# Patient Record
Sex: FEMALE | Race: WHITE | Employment: FULL TIME | URBAN - METROPOLITAN AREA
[De-identification: names, ages, dates, MRNs, and addresses within clinical notes are randomized per-mention and may not be internally consistent; named-entity substitution may affect disease eponyms.]

---

## 2017-07-24 ENCOUNTER — ALLSCRIPTS OFFICE VISIT (OUTPATIENT)
Dept: OTHER | Facility: OTHER | Age: 42
End: 2017-07-24

## 2017-07-24 DIAGNOSIS — Z00.00 ENCOUNTER FOR GENERAL ADULT MEDICAL EXAMINATION WITHOUT ABNORMAL FINDINGS: ICD-10-CM

## 2017-07-24 DIAGNOSIS — F41.8 OTHER SPECIFIED ANXIETY DISORDERS: ICD-10-CM

## 2017-07-24 DIAGNOSIS — E53.8 DEFICIENCY OF OTHER SPECIFIED B GROUP VITAMINS: ICD-10-CM

## 2017-07-24 DIAGNOSIS — R53.83 OTHER FATIGUE: ICD-10-CM

## 2017-07-24 DIAGNOSIS — E55.9 VITAMIN D DEFICIENCY: ICD-10-CM

## 2017-07-29 ENCOUNTER — GENERIC CONVERSION - ENCOUNTER (OUTPATIENT)
Dept: OTHER | Facility: OTHER | Age: 42
End: 2017-07-29

## 2017-07-29 LAB
AMBIG ABBREV CMP14 DEFAULT (HISTORICAL): NORMAL
AMBIG ABBREV LP DEFAULT (HISTORICAL): NORMAL

## 2017-07-30 LAB
A/G RATIO (HISTORICAL): 1.8 (ref 1.2–2.2)
ALBUMIN SERPL BCP-MCNC: 4.3 G/DL (ref 3.5–5.5)
ALP SERPL-CCNC: 41 IU/L (ref 39–117)
ALT SERPL W P-5'-P-CCNC: 19 IU/L (ref 0–32)
AST SERPL W P-5'-P-CCNC: 19 IU/L (ref 0–40)
BACTERIA UR QL AUTO: NORMAL
BILIRUB SERPL-MCNC: 0.5 MG/DL (ref 0–1.2)
BILIRUB UR QL STRIP: NEGATIVE
BUN SERPL-MCNC: 11 MG/DL (ref 6–24)
BUN/CREA RATIO (HISTORICAL): 11 (ref 9–23)
CALCIUM SERPL-MCNC: 9.3 MG/DL (ref 8.7–10.2)
CHLORIDE SERPL-SCNC: 100 MMOL/L (ref 96–106)
CHOLEST SERPL-MCNC: 162 MG/DL (ref 100–199)
CO2 SERPL-SCNC: 25 MMOL/L (ref 18–29)
COLOR UR: YELLOW
COMMENT (HISTORICAL): CLEAR
CREAT SERPL-MCNC: 1.02 MG/DL (ref 0.57–1)
DEPRECATED RDW RBC AUTO: 13.3 % (ref 12.3–15.4)
EGFR AFRICAN AMERICAN (HISTORICAL): 78 ML/MIN/1.73
EGFR-AMERICAN CALC (HISTORICAL): 68 ML/MIN/1.73
FECAL OCCULT BLOOD DIAGNOSTIC (HISTORICAL): NEGATIVE
GLUCOSE (HISTORICAL): NEGATIVE
GLUCOSE SERPL-MCNC: 97 MG/DL (ref 65–99)
HCT VFR BLD AUTO: 41.6 % (ref 34–46.6)
HDLC SERPL-MCNC: 50 MG/DL
HGB BLD-MCNC: 14.1 G/DL (ref 11.1–15.9)
KETONES UR STRIP-MCNC: NEGATIVE MG/DL
LDLC SERPL CALC-MCNC: 91 MG/DL (ref 0–99)
LEUKOCYTE ESTERASE UR QL STRIP: NEGATIVE
MCH RBC QN AUTO: 28.7 PG (ref 26.6–33)
MCHC RBC AUTO-ENTMCNC: 33.9 G/DL (ref 31.5–35.7)
MCV RBC AUTO: 85 FL (ref 79–97)
MICROSCOPIC EXAMINATION (HISTORICAL): NORMAL
MICROSCOPIC EXAMINATION (HISTORICAL): NORMAL
NITRITE UR QL STRIP: NEGATIVE
NON-SQ EPI CELLS URNS QL MICRO: NORMAL /HPF
PH UR STRIP.AUTO: 7 [PH] (ref 5–7.5)
PLATELET # BLD AUTO: 176 X10E3/UL (ref 150–379)
POTASSIUM SERPL-SCNC: 4.5 MMOL/L (ref 3.5–5.2)
PROT UR STRIP-MCNC: NEGATIVE MG/DL
RBC (HISTORICAL): 4.91 X10E6/UL (ref 3.77–5.28)
RBC (HISTORICAL): NORMAL /HPF
SODIUM SERPL-SCNC: 139 MMOL/L (ref 134–144)
SP GR UR STRIP.AUTO: 1 (ref 1–1.03)
TOT. GLOBULIN, SERUM (HISTORICAL): 2.4 G/DL (ref 1.5–4.5)
TOTAL PROTEIN (HISTORICAL): 6.7 G/DL (ref 6–8.5)
TRIGL SERPL-MCNC: 105 MG/DL (ref 0–149)
TSH SERPL DL<=0.05 MIU/L-ACNC: 2.33 UIU/ML (ref 0.45–4.5)
URINALYSIS (UA) (HISTORICAL): NORMAL
UROBILINOGEN UR QL STRIP.AUTO: 0.2 EU/DL (ref 0.2–1)
VIT B12 SERPL-MCNC: 743 PG/ML (ref 211–946)
VLDLC SERPL CALC-MCNC: 21 MG/DL (ref 5–40)
WBC # BLD AUTO: 4.5 X10E3/UL (ref 3.4–10.8)
WBC # BLD AUTO: NORMAL /HPF

## 2017-07-31 LAB — 25(OH)D3 SERPL-MCNC: 48.2 NG/ML (ref 30–100)

## 2018-01-10 NOTE — PROGRESS NOTES
Assessment    1  Situational anxiety (300 09) (F41 8)   2  Encounter for preventive health examination (V70 0) (Z00 00)    Plan  Fatigue, Situational anxiety    · (1) TSH; Status:Active; Requested for:66Bhs3758 03:55PM;   Fatigue, Vitamin B12 deficiency    · (1) VITAMIN B12; Status:Active; Requested for:39Fww1953 03:55PM;   Health Maintenance    · Turmeric Curcumin Oral Capsule; one  a day   · (1) CBC/ PLT (NO DIFF); Status:Active; Requested for:07Rwh8423 03:55PM;   Health Maintenance, Fatigue    · (1) COMPREHENSIVE METABOLIC PANEL; Status:Active; Requested for:[id number]  03:55PM;    · (1) LIPID PANEL, FASTING; Status:Active; Requested for:70Ktj3461 03:55PM;    · (1) URINALYSIS w URINE C/S REFLEX (will reflex a microscopy if leukocytes, occult  blood, or nitrites are not within normal limits); Status:Active; Requested for:84Gog7327  03:55PM;   Hemorrhoids, unspecified hemorrhoid type    · Proctozone-HC 2 5 % Rectal Cream; apply on affected area 3 times daily  Situational anxiety    · BusPIRone HCl - 10 MG Oral Tablet; TAKE ONE TABLET BY MOUTH THREE  TIMES DAILY  Vitamin D deficiency    · (1) VITAMIN D 25-HYDROXY; Status:Active; Requested for:24Jul2017 03:55PM;     Chief Complaint  Yearly PE, no concerns today      History of Present Illness  HM, Adult Female: The patient is being seen for a health maintenance evaluation  The last health maintenance visit was 1 year(s) ago  Social History: Household members include spouse, 3 daughter(s) and 7,5,2  She is   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  The patient has never smoked cigarettes  She reports rare alcohol use  She has never used illicit drugs  General Health: The patient's health since the last visit is described as good  She has regular dental visits  She complains of vision problems  Vision care includes wearing soft contact lenses and an eye examination within the last year  Lifestyle:  She consumes a diverse and healthy diet   She is on a diet and is generally adherent  She exercises regularly  She exercises 3 or more times per week with the kids  Exercise includes walking and running/jogging  Reproductive health:  she reports abnormal menses  Menstrual history: The cycles have been regular  she uses contraception  For contraception, she has an intrauterine device and copper IUD : proguard  pregnancy history: G 3  Screening: cancer screening reviewed and current  Cervical cancer screening includes a pap smear performed last year  metabolic screening reviewed and current  HPI: Did intervention in July with  [facility] [place]         Review of Systems    Constitutional: feeling tired  Eyes: no eyesight problems and no purulent discharge from the eyes  ENT: no nasal discharge  Cardiovascular: no chest pain and no palpitations  Respiratory: no cough and no shortness of breath during exertion  Gastrointestinal: no constipation  Genitourinary: no incontinence  Integumentary: no rashes  Psychiatric: anxiety  ROS reviewed  Active Problems    1  Chronic frontal sinusitis (473 1) (J32 1)   2  Encounter for lipid screening for cardiovascular disease (V77 91,V81 2) (Z13 220,Z13 6)   3  Fatigue (780 79) (R53 83)   4  Female stress incontinence (625 6) (N39 3)   5  Pregnancy (V22 2) (Z34 90)   6  Umbilical hernia (246 5) (K42 9)   7   Vitamin D deficiency (268 9) (E55 9)    Past Medical History    · History of Automatic Atrial Tachycardia (427 89)   · History of Chest tightness or pressure (786 59) (R07 89)   · History of varicella (V12 09) (Z86 19)   · History of Need for Tdap vaccination (V06 1) (Z23)   · History of Palpitations (785 1) (R00 2)    Surgical History    · History of Abdominoplasty   · History of Catheter Ablation Atrial Tachycardia   · History of Contraceptives Intrauterine Devices (IUD)    Family History  Mother    · Family history of Asperger's syndrome   · Family history of colonic polyps (V18 51) (Z83 71)   · Family history of diabetes mellitus (V18 0) (Z83 3)   · Family history of hyperlipidemia (V18 19) (Z83 49)   · Family history of hypertension (V17 49) (Z82 49)   · Family history of hypothyroidism (V18 19) (Z83 49)  Father    · Family history of Asperger's syndrome   · Family history of diabetes mellitus (V18 0) (Z83 3)   · Family history of hyperlipidemia (V18 19) (Z83 49)  Sibling    · Family history of Asperger's syndrome  Family History    · Family history of Diabetes Mellitus (V18 0)   · Family history of Hypertension (V17 49)    Social History    ·    · Never A Smoker    Current Meds   1  Calcium Citrate 1040 MG Oral Tablet; TAKE 1 TABLET DAILY; Therapy: [id number] to (Evaluate:13Jun2014); Last Rx:[id number] Ordered   2  Multivital TABS; TAKE 1 TABLET DAILY; Therapy: 55JTB6327 to (Evaluate:11Jun2014); Last Rx:[id number] Ordered    Allergies    1   No Known Drug Allergies    Vitals   Recorded: 22Vae5635 03:28PM Recorded: 13ZJB3595 56:74SX   Systolic 281    Diastolic 68    Height  5 ft 5 5 in   Weight  166 lb 6 oz   BMI Calculated  27 27   BSA Calculated  1 84     Signatures   Electronically signed by : Magdalena Wayne DO; Jul 25 6722  4:56PM EST                       (Author)

## 2018-01-14 VITALS
WEIGHT: 166.38 LBS | DIASTOLIC BLOOD PRESSURE: 68 MMHG | HEIGHT: 66 IN | SYSTOLIC BLOOD PRESSURE: 130 MMHG | BODY MASS INDEX: 26.74 KG/M2

## 2018-07-24 RX ORDER — BUSPIRONE HYDROCHLORIDE 10 MG/1
1 TABLET ORAL 3 TIMES DAILY
COMMUNITY
Start: 2017-07-24 | End: 2018-07-25 | Stop reason: ALTCHOICE

## 2018-07-24 RX ORDER — CALCIUM CITRATE 1040MG
1 TABLET ORAL DAILY
COMMUNITY
Start: 2014-05-12

## 2018-07-25 ENCOUNTER — OFFICE VISIT (OUTPATIENT)
Dept: FAMILY MEDICINE CLINIC | Facility: CLINIC | Age: 43
End: 2018-07-25
Payer: COMMERCIAL

## 2018-07-25 VITALS
WEIGHT: 170.6 LBS | DIASTOLIC BLOOD PRESSURE: 60 MMHG | SYSTOLIC BLOOD PRESSURE: 120 MMHG | BODY MASS INDEX: 28.42 KG/M2 | HEIGHT: 65 IN

## 2018-07-25 DIAGNOSIS — L65.9 HAIR LOSS: ICD-10-CM

## 2018-07-25 DIAGNOSIS — E55.9 VITAMIN D DEFICIENCY: ICD-10-CM

## 2018-07-25 DIAGNOSIS — R05.8 ALLERGIC COUGH: ICD-10-CM

## 2018-07-25 DIAGNOSIS — Z00.00 WELL ADULT HEALTH CHECK: Primary | ICD-10-CM

## 2018-07-25 DIAGNOSIS — E53.8 VITAMIN B12 DEFICIENCY: ICD-10-CM

## 2018-07-25 PROBLEM — R53.82 CHRONIC FATIGUE: Status: ACTIVE | Noted: 2018-07-25

## 2018-07-25 PROBLEM — F41.8 SITUATIONAL ANXIETY: Status: ACTIVE | Noted: 2017-07-24

## 2018-07-25 PROCEDURE — 99396 PREV VISIT EST AGE 40-64: CPT | Performed by: FAMILY MEDICINE

## 2018-07-25 NOTE — PROGRESS NOTES
Assessment/Plan:     Diagnoses and all orders for this visit:    Well adult health check  -     TSH, 3rd generation with T4 reflex; Future  -     Lipid Panel with Direct LDL reflex; Future  -     HEMOGLOBIN A1C W/ EAG ESTIMATION; Future    Vitamin D deficiency  -     Vitamin D 25 hydroxy; Future    Vitamin B12 deficiency  -     Vitamin B12; Future    Hair loss  -     TSH, 3rd generation with T4 reflex; Future  -     Sedimentation rate, automated; Future  -     Comprehensive metabolic panel; Future  -     Testosterone; Future  -     Cortisol Level, AM Specimen; Future  -     DHEA-sulfate; Future  -     HEMOGLOBIN A1C W/ EAG ESTIMATION; Future  -     Estradiol; Future    Allergic cough  -     Respiratory Allergy Profile Region I; Future  -     Food Allergy Profile; Future    Other orders  -     Discontinue: busPIRone (BUSPAR) 10 mg tablet; Take 1 tablet by mouth 3 (three) times a day  -     Calcium Citrate 1040 MG TABS; Take 1 tablet by mouth daily  -     Multiple Vitamins-Minerals (MULTIVITAL) tablet; Take 1 tablet by mouth daily  -     TURMERIC CURCUMIN PO; Take by mouth  -     BIOTIN PO; Take by mouth          Subjective:   Chief Complaint   Patient presents with    Physical Exam     no concerns today       Patient ID: Lashell Whitmore is a 37 y o  female      HPI    The following portions of the patient's history were reviewed and updated as appropriate: allergies, current medications, past family history, past medical history, past social history, past surgical history and problem list       Review of Systems      Objective:  /60   Ht 5' 4 5" (1 638 m)   Wt 77 4 kg (170 lb 9 6 oz)   BMI 28 83 kg/m²        Physical Exam

## 2018-07-31 LAB — HBA1C MFR BLD HPLC: 5.2 %

## 2018-08-07 ENCOUNTER — TELEPHONE (OUTPATIENT)
Dept: FAMILY MEDICINE CLINIC | Facility: CLINIC | Age: 43
End: 2018-08-07

## 2018-08-07 NOTE — TELEPHONE ENCOUNTER
Patient called and stated she had bw done on 07/31/2018 at [place], asking for the results  Its not yet in patients chart, when you see it can you send to the doctor for review

## 2018-08-08 LAB
25(OH)D3+25(OH)D2 SERPL-MCNC: 47.3 NG/ML (ref 30–100)
A ALTERNATA IGE QN: <0.1 KU/L
A FUMIGATUS IGE QN: <0.1 KU/L
ALBUMIN SERPL-MCNC: 4.5 G/DL (ref 3.5–5.5)
ALBUMIN/GLOB SERPL: 1.8 {RATIO} (ref 1.2–2.2)
ALP SERPL-CCNC: 44 IU/L (ref 39–117)
ALT SERPL-CCNC: 18 IU/L (ref 0–32)
AMBIG ABBREV DEFAULT: NORMAL
AST SERPL-CCNC: 19 IU/L (ref 0–40)
BERMUDA GRASS IGE QN: <0.1 KU/L
BILIRUB SERPL-MCNC: 0.5 MG/DL (ref 0–1.2)
BOXELDER IGE QN: <0.1 KU/L
BUN SERPL-MCNC: 15 MG/DL (ref 6–24)
BUN/CREAT SERPL: 16 (ref 9–23)
C HERBARUM IGE QN: <0.1 KU/L
CALCIUM SERPL-MCNC: 9 MG/DL (ref 8.7–10.2)
CAT DANDER IGE QN: 1.59 KU/L
CHLORIDE SERPL-SCNC: 100 MMOL/L (ref 96–106)
CHOLEST SERPL-MCNC: 168 MG/DL (ref 100–199)
CMN PIGWEED IGE QN: <0.1 KU/L
CO2 SERPL-SCNC: 23 MMOL/L (ref 20–29)
CODFISH IGE QN: <0.1 KU/L
COMMON RAGWEED IGE QN: 1.95 KU/L
CORTIS AM PEAK SERPL-MCNC: 8 UG/DL (ref 6.2–19.4)
COTTONWOOD IGE QN: ABNORMAL KU/L
COW MILK IGE QN: 0.17 KU/L
CREAT SERPL-MCNC: 0.91 MG/DL (ref 0.57–1)
D FARINAE IGE QN: 1.01 KU/L
D PTERONYSS IGE QN: 1.06 KU/L
DHEA-S SERPL-MCNC: 320.4 UG/DL (ref 57.3–279.2)
DOG DANDER IGE QN: <0.1 KU/L
EGG WHITE IGE QN: <0.1 KU/L
ERYTHROCYTE [SEDIMENTATION RATE] IN BLOOD BY WESTERGREN METHOD: 2 MM/HR (ref 0–32)
EST. AVERAGE GLUCOSE BLD GHB EST-MCNC: 103 MG/DL
ESTRADIOL SERPL-MCNC: 38.6 PG/ML
GLOBULIN SER-MCNC: 2.5 G/DL (ref 1.5–4.5)
GLUCOSE SERPL-MCNC: 87 MG/DL (ref 65–99)
HBA1C MFR BLD: 5.2 % (ref 4.8–5.6)
HDLC SERPL-MCNC: 48 MG/DL
IGE SERPL-ACNC: 51 IU/ML (ref 0–100)
LDLC SERPL CALC-MCNC: 105 MG/DL (ref 0–99)
LDLC/HDLC SERPL: 2.2 RATIO (ref 0–3.2)
LONDON PLANE IGE QN: ABNORMAL KU/L
Lab: ABNORMAL
MOUSE URINE PROT IGE QN: <0.1 KU/L
MT JUNIPER IGE QN: <0.1 KU/L
MUGWORT IGE QN: ABNORMAL KU/L
PEANUT IGE QN: <0.1 KU/L
PENICILLIUM CHRYSOGENUM: <0.1 KU/L
POTASSIUM SERPL-SCNC: 4.1 MMOL/L (ref 3.5–5.2)
PROT SERPL-MCNC: 7 G/DL (ref 6–8.5)
ROACH IGE QN: <0.1 KU/L
SHEEP SORREL IGE QN: ABNORMAL KU/L
SILVER BIRCH IGE QN: ABNORMAL KU/L
SL AMB EGFR AFRICAN AMERICAN: 89 ML/MIN/1.73
SL AMB EGFR NON AFRICAN AMERICAN: 78 ML/MIN/1.73
SL AMB VLDL CHOLESTEROL CALC: 15 MG/DL (ref 5–40)
SODIUM SERPL-SCNC: 140 MMOL/L (ref 134–144)
SOYBEAN IGE QN: <0.1 KU/L
TESTOST SERPL-MCNC: 22 NG/DL (ref 8–48)
TIMOTHY IGE QN: <0.1 KU/L
TRIGL SERPL-MCNC: 77 MG/DL (ref 0–149)
TSH SERPL DL<=0.005 MIU/L-ACNC: 2.13 UIU/ML (ref 0.45–4.5)
VIT B12 SERPL-MCNC: 774 PG/ML (ref 232–1245)
WALNUT IGE: <0.1 KU/L
WHEAT IGE QN: <0.1 KU/L
WHITE ASH IGE QN: ABNORMAL KU/L
WHITE ELM IGE QN: <0.1 KU/L
WHITE MULBERRY IGE QN: ABNORMAL KU/L
WHITE OAK IGE QN: <0.1 KU/L

## 2018-08-10 NOTE — TELEPHONE ENCOUNTER
I did speak to patient and make her aware still awaiting results as some of the specialized testing  Patient also received a call from [facility] asking her to come back in to have more blood drawn  Will keep looking for results to be received

## 2018-08-12 LAB
A ALTERNATA IGE QN: <0.1 KU/L
A FUMIGATUS IGE QN: <0.1 KU/L
BERMUDA GRASS IGE QN: <0.1 KU/L
BOXELDER IGE QN: <0.1 KU/L
C HERBARUM IGE QN: <0.1 KU/L
CAT DANDER IGE QN: 1.51 KU/L
CMN PIGWEED IGE QN: <0.1 KU/L
CODFISH IGE QN: <0.1 KU/L
COMMON RAGWEED IGE QN: 1.96 KU/L
COTTONWOOD IGE QN: <0.1 KU/L
COW MILK IGE QN: 0.21 KU/L
D FARINAE IGE QN: 1.01 KU/L
D PTERONYSS IGE QN: 0.96 KU/L
DOG DANDER IGE QN: <0.1 KU/L
EGG WHITE IGE QN: <0.1 KU/L
IGE SERPL-ACNC: 53 IU/ML (ref 0–100)
LONDON PLANE IGE QN: <0.1 KU/L
MOUSE URINE PROT IGE QN: <0.1 KU/L
MT JUNIPER IGE QN: <0.1 KU/L
MUGWORT IGE QN: <0.1 KU/L
PEANUT IGE QN: <0.1 KU/L
PENICILLIUM CHRYSOGENUM: <0.1 KU/L
ROACH IGE QN: <0.1 KU/L
SHEEP SORREL IGE QN: <0.1 KU/L
SILVER BIRCH IGE QN: <0.1 KU/L
SOYBEAN IGE QN: <0.1 KU/L
TIMOTHY IGE QN: <0.1 KU/L
WALNUT IGE: <0.1 KU/L
WHEAT IGE QN: <0.1 KU/L
WHITE ASH IGE QN: <0.1 KU/L
WHITE ELM IGE QN: <0.1 KU/L
WHITE MULBERRY IGE QN: <0.1 KU/L
WHITE OAK IGE QN: <0.1 KU/L

## 2019-08-30 ENCOUNTER — OFFICE VISIT (OUTPATIENT)
Dept: FAMILY MEDICINE CLINIC | Facility: CLINIC | Age: 44
End: 2019-08-30
Payer: COMMERCIAL

## 2019-08-30 VITALS
TEMPERATURE: 97.8 F | HEIGHT: 65 IN | SYSTOLIC BLOOD PRESSURE: 112 MMHG | OXYGEN SATURATION: 97 % | DIASTOLIC BLOOD PRESSURE: 60 MMHG | BODY MASS INDEX: 28.92 KG/M2 | WEIGHT: 173.6 LBS | HEART RATE: 67 BPM

## 2019-08-30 DIAGNOSIS — E53.8 VITAMIN B12 DEFICIENCY: ICD-10-CM

## 2019-08-30 DIAGNOSIS — Z00.00 WELL ADULT HEALTH CHECK: Primary | ICD-10-CM

## 2019-08-30 DIAGNOSIS — L65.9 HAIR LOSS: ICD-10-CM

## 2019-08-30 DIAGNOSIS — R53.82 CHRONIC FATIGUE: ICD-10-CM

## 2019-08-30 DIAGNOSIS — F41.9 ANXIETY: ICD-10-CM

## 2019-08-30 DIAGNOSIS — E55.9 VITAMIN D DEFICIENCY: ICD-10-CM

## 2019-08-30 PROCEDURE — 99396 PREV VISIT EST AGE 40-64: CPT | Performed by: FAMILY MEDICINE

## 2019-08-30 NOTE — PROGRESS NOTES
Well Adult Physical   Patient here for a comprehensive physical exam The patient reports anxiety    Do you take any herbs or supplements that were not prescribed by a doctor? no   Are you taking calcium supplements? yes   Are you taking aspirin daily? no     History:  LMP:19    Last pap date:   Abnormal pap? yes  : 0  Para: 1 42-year-old female who is here for annual physical      Review of Systems - History obtained from the patient  Respiratory ROS: no cough, shortness of breath, or wheezing  Cardiovascular ROS: no chest pain or dyspnea on exertion  Gastrointestinal ROS: no abdominal pain, change in bowel habits, or black or bloody stools  Musculoskeletal ROS: negative  Neurological ROS: no TIA or stroke symptoms    Slight anxiety and stress  Had 3 year colonoscopy polyp Repeat 3 years  Physical Exam   Constitutional: She appears well-developed and well-nourished  Vitals reviewed  Vitals:    19 1121 19 1146   BP:  112/60   Pulse: 67    Temp: 97 8 °F (36 6 °C)    SpO2: 97%    Weight: 78 7 kg (173 lb 9 6 oz)    Height: 5' 5" (1 651 m)    Body mass index is 28 89 kg/m²  Chief Complaint   Patient presents with    Physical Exam     Todd Lara was seen today for physical exam     Diagnoses and all orders for this visit:    Well adult health check    Hair loss  -     Cyclic citrul peptide antibody, IgG; Future  -     C-reactive protein; Future  -     TSH, 3rd generation; Future  -     T3, reverse; Future  -     T4, free; Future  -     Comprehensive metabolic panel; Future  -     Lipid Panel with Direct LDL reflex; Future  -     T3, free; Future  -     Cancel: UA w Reflex to Microscopic w Reflex to Culture - Clinic Collect  -     Uric acid; Future  -     Vitamin B12; Future  -     Vitamin D 25 hydroxy;  Future  -     UA (URINE) with reflex to Microscopic    Anxiety  -     UA (URINE) with reflex to Microscopic    Vitamin B12 deficiency  -     Vitamin B12; Future    Vitamin D deficiency  -     Vitamin D 25 hydroxy; Future    Chronic fatigue  -     Cyclic citrul peptide antibody, IgG; Future  -     C-reactive protein; Future  -     TSH, 3rd generation; Future  -     T3, reverse; Future  -     T4, free; Future  -     Comprehensive metabolic panel; Future  -     T3, free; Future  -     Cancel: UA w Reflex to Microscopic w Reflex to Culture - Clinic Collect  -     Vitamin B12; Future  -     Vitamin D 25 hydroxy; Future  -     UA (URINE) with reflex to Microscopic    BMI Counseling: Body mass index is 28 89 kg/m²  Discussed the patient's BMI with her  The BMI is above average  BMI counseling and education was provided to the patient  Nutrition recommendations include reducing portion sizes, decreasing overall calorie intake, 3-5 servings of fruits/vegetables daily, moderation in carbohydrate intake and increasing intake of lean protein  Exercise recommendations include exercising 3-5 times per week

## 2019-09-18 LAB
25(OH)D3+25(OH)D2 SERPL-MCNC: 54.6 NG/ML (ref 30–100)
ALBUMIN SERPL-MCNC: 4.7 G/DL (ref 3.5–5.5)
ALBUMIN/GLOB SERPL: 2 {RATIO} (ref 1.2–2.2)
ALP SERPL-CCNC: 41 IU/L (ref 39–117)
ALT SERPL-CCNC: 15 IU/L (ref 0–32)
APPEARANCE UR: CLEAR
AST SERPL-CCNC: 17 IU/L (ref 0–40)
BACTERIA URNS QL MICRO: ABNORMAL
BILIRUB SERPL-MCNC: 0.9 MG/DL (ref 0–1.2)
BILIRUB UR QL STRIP: NEGATIVE
BUN SERPL-MCNC: 12 MG/DL (ref 6–24)
BUN/CREAT SERPL: 13 (ref 9–23)
CALCIUM SERPL-MCNC: 9.1 MG/DL (ref 8.7–10.2)
CCP IGA+IGG SERPL IA-ACNC: 3 UNITS (ref 0–19)
CHLORIDE SERPL-SCNC: 101 MMOL/L (ref 96–106)
CHOLEST SERPL-MCNC: 159 MG/DL (ref 100–199)
CO2 SERPL-SCNC: 22 MMOL/L (ref 20–29)
COLOR UR: ABNORMAL
CREAT SERPL-MCNC: 0.94 MG/DL (ref 0.57–1)
CRP SERPL-MCNC: <1 MG/L (ref 0–10)
EPI CELLS #/AREA URNS HPF: ABNORMAL /HPF (ref 0–10)
GLOBULIN SER-MCNC: 2.3 G/DL (ref 1.5–4.5)
GLUCOSE SERPL-MCNC: 88 MG/DL (ref 65–99)
GLUCOSE UR QL: NEGATIVE
HDLC SERPL-MCNC: 44 MG/DL
HGB UR QL STRIP: ABNORMAL
KETONES UR QL STRIP: NEGATIVE
LDLC SERPL CALC-MCNC: 95 MG/DL (ref 0–99)
LDLC/HDLC SERPL: 2.2 RATIO (ref 0–3.2)
LEUKOCYTE ESTERASE UR QL STRIP: ABNORMAL
MICRO URNS: ABNORMAL
NITRITE UR QL STRIP: NEGATIVE
PH UR STRIP: 7.5 [PH] (ref 5–7.5)
POTASSIUM SERPL-SCNC: 3.8 MMOL/L (ref 3.5–5.2)
PROT SERPL-MCNC: 7 G/DL (ref 6–8.5)
PROT UR QL STRIP: ABNORMAL
RBC #/AREA URNS HPF: ABNORMAL /HPF (ref 0–2)
SL AMB EGFR AFRICAN AMERICAN: 85 ML/MIN/1.73
SL AMB EGFR NON AFRICAN AMERICAN: 74 ML/MIN/1.73
SL AMB VLDL CHOLESTEROL CALC: 20 MG/DL (ref 5–40)
SODIUM SERPL-SCNC: 138 MMOL/L (ref 134–144)
SP GR UR: <=1.005 (ref 1–1.03)
T3FREE SERPL-MCNC: 2.9 PG/ML (ref 2–4.4)
T3REVERSE SERPL-MCNC: 19.6 NG/DL (ref 9.2–24.1)
T4 FREE SERPL-MCNC: 1.24 NG/DL (ref 0.82–1.77)
TRIGL SERPL-MCNC: 102 MG/DL (ref 0–149)
TSH SERPL DL<=0.005 MIU/L-ACNC: 1.93 UIU/ML (ref 0.45–4.5)
URATE SERPL-MCNC: 5.1 MG/DL (ref 2.5–7.1)
UROBILINOGEN UR STRIP-ACNC: 0.2 MG/DL (ref 0.2–1)
VIT B12 SERPL-MCNC: 673 PG/ML (ref 232–1245)
WBC #/AREA URNS HPF: ABNORMAL /HPF (ref 0–5)

## 2019-11-21 ENCOUNTER — TELEPHONE (OUTPATIENT)
Dept: FAMILY MEDICINE CLINIC | Facility: CLINIC | Age: 44
End: 2019-11-21

## 2019-11-21 DIAGNOSIS — K64.9 HEMORRHOIDS, UNSPECIFIED HEMORRHOID TYPE: Primary | ICD-10-CM

## 2020-09-03 ENCOUNTER — OFFICE VISIT (OUTPATIENT)
Dept: FAMILY MEDICINE CLINIC | Facility: CLINIC | Age: 45
End: 2020-09-03
Payer: COMMERCIAL

## 2020-09-03 VITALS
SYSTOLIC BLOOD PRESSURE: 120 MMHG | TEMPERATURE: 97 F | OXYGEN SATURATION: 98 % | RESPIRATION RATE: 16 BRPM | BODY MASS INDEX: 27.82 KG/M2 | WEIGHT: 167 LBS | HEART RATE: 80 BPM | DIASTOLIC BLOOD PRESSURE: 60 MMHG | HEIGHT: 65 IN

## 2020-09-03 DIAGNOSIS — E53.8 VITAMIN B12 DEFICIENCY: ICD-10-CM

## 2020-09-03 DIAGNOSIS — Z00.00 WELL ADULT HEALTH CHECK: Primary | ICD-10-CM

## 2020-09-03 DIAGNOSIS — E55.9 VITAMIN D DEFICIENCY: ICD-10-CM

## 2020-09-03 DIAGNOSIS — R53.82 CHRONIC FATIGUE: ICD-10-CM

## 2020-09-03 DIAGNOSIS — L65.9 HAIR LOSS: ICD-10-CM

## 2020-09-03 DIAGNOSIS — Z83.3 FAMILY HISTORY OF DIABETES MELLITUS TYPE II: ICD-10-CM

## 2020-09-03 PROCEDURE — 3725F SCREEN DEPRESSION PERFORMED: CPT | Performed by: FAMILY MEDICINE

## 2020-09-03 PROCEDURE — 1036F TOBACCO NON-USER: CPT | Performed by: FAMILY MEDICINE

## 2020-09-03 PROCEDURE — 99396 PREV VISIT EST AGE 40-64: CPT | Performed by: FAMILY MEDICINE

## 2020-09-03 RX ORDER — NORETHINDRONE AND ETHINYL ESTRADIOL 1 MG-35MCG
1 KIT ORAL DAILY
COMMUNITY
Start: 2020-07-19 | End: 2021-09-08

## 2020-09-03 RX ORDER — SPIRONOLACTONE 100 MG/1
200 TABLET, FILM COATED ORAL DAILY
COMMUNITY
Start: 2020-08-25 | End: 2021-09-08

## 2020-09-03 NOTE — PROGRESS NOTES
BMI Counseling: Body mass index is 28 16 kg/m²  The BMI is above normal  Nutrition recommendations include reducing portion sizes, decreasing overall calorie intake, 3-5 servings of fruits/vegetables daily, reducing fast food intake, consuming healthier snacks, decreasing soda and/or juice intake, moderation in carbohydrate intake, increasing intake of lean protein, reducing intake of saturated fat and trans fat and reducing intake of cholesterol  Exercise recommendations include vigorous aerobic physical activity for 75 minutes/week

## 2020-09-03 NOTE — PROGRESS NOTES
Assessment/Plan:     Diagnoses and all orders for this visit:    Well adult health check  -     Vitamin D 25 hydroxy; Future  -     Vitamin B12; Future  -     T3, free; Future  -     T4, free; Future  -     TSH, 3rd generation; Future  -     Lipid Panel with Direct LDL reflex; Future  -     Comprehensive metabolic panel; Future    Vitamin B12 deficiency  -     Vitamin B12; Future    Vitamin D deficiency  -     Vitamin D 25 hydroxy; Future    Chronic fatigue  -     T3, free; Future  -     T4, free; Future  -     TSH, 3rd generation; Future    Hair loss  -     T3, free; Future  -     T4, free; Future  -     TSH, 3rd generation; Future  -     Comprehensive metabolic panel; Future    Family history of diabetes mellitus type II  -     HEMOGLOBIN A1C W/ EAG ESTIMATION; Future    Other orders  -     spironolactone (ALDACTONE) 100 mg tablet; Take 200 mg by mouth daily  -     Alyacen 1/35 1-35 MG-MCG per tablet; Take 1 tablet by mouth daily        Update lab work and continue to follow with her specialists for her only issues  Wished her well with regards to her home stressors and divorce  Subjective:   Chief Complaint   Patient presents with    Well Check     annual pe      Patient ID: Kae Brittle is a 39 y o  female  80-year-old female who is here for well check  She has been able to work from home during [facility]  Issues still with  , living together but working on divorce  Girls are 10, 8 and 5  Sees Yasmin Kovacs MD for hormonal issues  On spironolactone  And 1/35 OC for DHEA and adrenal supplement  Could be doing better with dietary and fitness   The following portions of the patient's history were reviewed and updated as appropriate: allergies, current medications, past family history, past medical history, past social history, past surgical history and problem list       Review of Systems   Constitutional: Positive for fatigue  HENT: Dental problem: Up-to-date      Respiratory: Negative for cough and shortness of breath  Cardiovascular: Negative for chest pain  Genitourinary: Menstrual problem:  perimenopausal, adrenal fatigue, hair growth  Musculoskeletal: Negative  Psychiatric/Behavioral: Negative for decreased concentration, dysphoric mood and sleep disturbance  The patient is not nervous/anxious  Home stressors         Objective:      /60   Pulse 80   Temp (!) 97 °F (36 1 °C) (Temporal)   Resp 16   Ht 5' 4 57" (1 64 m)   Wt 75 8 kg (167 lb)   SpO2 98%   BMI 28 16 kg/m²          Physical Exam  Constitutional:       General: She is not in acute distress  Appearance: Normal appearance  She is well-developed  Comments: 40-year-old female who appears her stated age with BMI of 28%   HENT:      Head: Normocephalic  Right Ear: Tympanic membrane normal       Left Ear: Tympanic membrane normal    Eyes:      Conjunctiva/sclera: Conjunctivae normal       Pupils: Pupils are equal, round, and reactive to light  Neck:      Musculoskeletal: Normal range of motion and neck supple  Cardiovascular:      Rate and Rhythm: Normal rate and regular rhythm  Pulses: Normal pulses  Heart sounds: Normal heart sounds  No murmur  Pulmonary:      Effort: Pulmonary effort is normal       Breath sounds: Normal breath sounds  Abdominal:      General: Bowel sounds are normal       Palpations: Abdomen is soft  There is no mass  Tenderness: There is no abdominal tenderness  Musculoskeletal: Normal range of motion  Skin:     General: Skin is warm and dry  Neurological:      Mental Status: She is alert and oriented to person, place, and time  Deep Tendon Reflexes: Reflexes are normal and symmetric  Psychiatric:         Mood and Affect: Mood normal          Behavior: Behavior normal          Thought Content:  Thought content normal          Judgment: Judgment normal

## 2020-09-19 LAB
25(OH)D3+25(OH)D2 SERPL-MCNC: 38.6 NG/ML (ref 30–100)
ALBUMIN SERPL-MCNC: 4.2 G/DL (ref 3.8–4.8)
ALBUMIN/GLOB SERPL: 1.7 {RATIO} (ref 1.2–2.2)
ALP SERPL-CCNC: 35 IU/L (ref 39–117)
ALT SERPL-CCNC: 19 IU/L (ref 0–32)
AST SERPL-CCNC: 23 IU/L (ref 0–40)
BILIRUB SERPL-MCNC: 0.4 MG/DL (ref 0–1.2)
BUN SERPL-MCNC: 15 MG/DL (ref 6–24)
BUN/CREAT SERPL: 14 (ref 9–23)
CALCIUM SERPL-MCNC: 9.4 MG/DL (ref 8.7–10.2)
CHLORIDE SERPL-SCNC: 100 MMOL/L (ref 96–106)
CHOLEST SERPL-MCNC: 211 MG/DL (ref 100–199)
CHOLEST/HDLC SERPL: 3.3 RATIO (ref 0–4.4)
CO2 SERPL-SCNC: 25 MMOL/L (ref 20–29)
CREAT SERPL-MCNC: 1.04 MG/DL (ref 0.57–1)
EST. AVERAGE GLUCOSE BLD GHB EST-MCNC: 100 MG/DL
GLOBULIN SER-MCNC: 2.5 G/DL (ref 1.5–4.5)
GLUCOSE SERPL-MCNC: 90 MG/DL (ref 65–99)
HBA1C MFR BLD: 5.1 % (ref 4.8–5.6)
HDLC SERPL-MCNC: 63 MG/DL
LDLC SERPL DIRECT ASSAY-MCNC: 119 MG/DL (ref 0–99)
POTASSIUM SERPL-SCNC: 4.8 MMOL/L (ref 3.5–5.2)
PROT SERPL-MCNC: 6.7 G/DL (ref 6–8.5)
SL AMB EGFR AFRICAN AMERICAN: 75 ML/MIN/1.73
SL AMB EGFR NON AFRICAN AMERICAN: 65 ML/MIN/1.73
SODIUM SERPL-SCNC: 136 MMOL/L (ref 134–144)
T3FREE SERPL-MCNC: 3.3 PG/ML (ref 2–4.4)
T4 FREE SERPL-MCNC: 1.26 NG/DL (ref 0.82–1.77)
TRIGL SERPL-MCNC: 125 MG/DL (ref 0–149)
TSH SERPL DL<=0.005 MIU/L-ACNC: 1.67 UIU/ML (ref 0.45–4.5)
VIT B12 SERPL-MCNC: 465 PG/ML (ref 232–1245)

## 2020-12-24 ENCOUNTER — VBI (OUTPATIENT)
Dept: ADMINISTRATIVE | Facility: OTHER | Age: 45
End: 2020-12-24

## 2021-09-08 ENCOUNTER — OFFICE VISIT (OUTPATIENT)
Dept: FAMILY MEDICINE CLINIC | Facility: CLINIC | Age: 46
End: 2021-09-08
Payer: COMMERCIAL

## 2021-09-08 VITALS
HEART RATE: 93 BPM | DIASTOLIC BLOOD PRESSURE: 60 MMHG | OXYGEN SATURATION: 97 % | SYSTOLIC BLOOD PRESSURE: 120 MMHG | WEIGHT: 170.2 LBS | BODY MASS INDEX: 28.36 KG/M2 | TEMPERATURE: 97.1 F | HEIGHT: 65 IN | RESPIRATION RATE: 16 BRPM

## 2021-09-08 DIAGNOSIS — Z12.4 SCREENING FOR CERVICAL CANCER: ICD-10-CM

## 2021-09-08 DIAGNOSIS — E55.9 VITAMIN D DEFICIENCY: ICD-10-CM

## 2021-09-08 DIAGNOSIS — Z83.49 FAMILY HISTORY OF THYROID DISORDER: ICD-10-CM

## 2021-09-08 DIAGNOSIS — Z00.00 WELL ADULT HEALTH CHECK: Primary | ICD-10-CM

## 2021-09-08 DIAGNOSIS — E53.8 VITAMIN B12 DEFICIENCY: ICD-10-CM

## 2021-09-08 DIAGNOSIS — Z12.31 SCREENING MAMMOGRAM, ENCOUNTER FOR: ICD-10-CM

## 2021-09-08 PROCEDURE — 1036F TOBACCO NON-USER: CPT | Performed by: FAMILY MEDICINE

## 2021-09-08 PROCEDURE — 99396 PREV VISIT EST AGE 40-64: CPT | Performed by: FAMILY MEDICINE

## 2021-09-08 PROCEDURE — 3008F BODY MASS INDEX DOCD: CPT | Performed by: FAMILY MEDICINE

## 2021-09-08 PROCEDURE — 3725F SCREEN DEPRESSION PERFORMED: CPT | Performed by: FAMILY MEDICINE

## 2021-09-08 RX ORDER — SODIUM PICOSULFATE, MAGNESIUM OXIDE, AND ANHYDROUS CITRIC ACID 10; 3.5; 12 MG/160ML; G/160ML; G/160ML
LIQUID ORAL
COMMUNITY
Start: 2021-08-10 | End: 2021-09-08

## 2021-09-08 NOTE — PROGRESS NOTES
[facility]    NAME: Bao Benz  AGE: 55 y o  SEX: female  : 1975     DATE: 2021     Assessment and Plan:   Bao Thomason was seen today for well check  Diagnoses and all orders for this visit:    Well adult health check  -     TSH, 3rd generation; Future  -     T4, free; Future  -     Lipid Panel with Direct LDL reflex; Future  -     Cancel: UA (URINE) with reflex to Scope  -     T3, reverse; Future  -     Anti-microsomal antibody; Future  -     Urinalysis with microscopic    Screening for cervical cancer    Screening mammogram, encounter for    Vitamin B12 deficiency  -     Vitamin B12; Future    Vitamin D deficiency  -     Vitamin D 25 hydroxy; Future    Family history of thyroid disorder  -     T3, reverse; Future  -     Anti-microsomal antibody; Future      Problem List Items Addressed This Visit        Other    Vitamin B12 deficiency    Relevant Orders    Vitamin B12    Vitamin D deficiency    Relevant Orders    Vitamin D 25 hydroxy      Other Visit Diagnoses     Well adult health check    -  Primary    Relevant Orders    TSH, 3rd generation    T4, free    Lipid Panel with Direct LDL reflex    T3, reverse    Anti-microsomal antibody    Urinalysis with microscopic    Screening for cervical cancer        Screening mammogram, encounter for        Family history of thyroid disorder        Relevant Orders    T3, reverse    Anti-microsomal antibody          Immunizations and preventive care screenings were discussed with patient today  Appropriate education was printed on patient's after visit summary  Counseling:  Alcohol/drug use: discussed moderation in alcohol intake, the recommendations for healthy alcohol use  Dental Health: discussed importance of regular tooth brushing, flossing, and dental visits    Injury prevention: discussed safety/seat belts, safety helmets, smoke detectors, carbon dioxide detectors, and smoking near bedding or upholstery  · Sexual health: NA   · Exercise: the importance of regular exercise/physical activity was discussed  Recommend exercise 3-5 times per week for at least 30 minutes  BMI Counseling: Body mass index is 28 7 kg/m²  The BMI is above normal  Nutrition recommendations include decreasing portion sizes, moderation in carbohydrate intake and increasing intake of lean protein  Exercise recommendations include vigorous physical activity 75 minutes/week  Patient referred to PCP due to patient being overweight  Return in about 1 year (around 9/8/2022) for Annual physical      Chief Complaint:     Chief Complaint   Patient presents with    Well Check      History of Present Illness:     Adult Annual Physical   Patient here for a comprehensive physical exam  The patient reports no problems  Diet and Physical Activity  · Diet/Nutrition: well balanced diet  · Exercise: walking and 3-4 times a week on average  Week on, week off with children   shared custody (end of January)  COVID vaccinated fully    Depression Screening  PHQ-9 Depression Screening    PHQ-9:   Frequency of the following problems over the past two weeks:      Little interest or pleasure in doing things: 0 - not at all  Feeling down, depressed, or hopeless: 0 - not at all  PHQ-2 Score: 0       General Health  · Sleep: variable  · Hearing: normal - bilateral   · Vision: most recent eye exam <1 year ago  · Dental: regular dental visits  /GYN Health  · Patient is: Center for [address] [place] [address] Gynecology  · Last menstrual period: was on OC's and spironolactone no change in hair  · Contraceptive method: oral contraceptives  But not currently sexually active  Review of Systems:     Review of Systems   Constitutional: Negative for fatigue  HENT: Positive for rhinorrhea (allergies)  Eyes: Negative for visual disturbance  Respiratory: Negative for shortness of breath      Cardiovascular: Negative for chest pain  Psychiatric/Behavioral: Positive for dysphoric mood and sleep disturbance  The patient is nervous/anxious (All related to recent march all discord and divorce and worries about children)  Stress      Past Medical History:     Past Medical History:   Diagnosis Date    Automatic atrial tachycardia ([address] )     Palpitations     last assessed 10/12/2012      Past Surgical History:     Past Surgical History:   Procedure Laterality Date    ABDOMINOPLASTY      ATRIAL ABLATION SURGERY      atrial tachycardia      Social History:     Social History     Socioeconomic History    Marital status: /Civil Union     Spouse name: None    Number of children: None    Years of education: None    Highest education level: None   Occupational History    None   Tobacco Use    Smoking status: Never Smoker    Smokeless tobacco: Never Used   Substance and Sexual Activity    Alcohol use: Never    Drug use: Never    Sexual activity: Not Currently   Other Topics Concern    None   Social History Narrative    Trying to divorce     Social Determinants of Health     Financial Resource Strain:     Difficulty of Paying Living Expenses:    Food Insecurity:     Worried About Running Out of Food in the Last Year:     [address] in the Last Year:    Transportation Needs:     Lack of Transportation (Medical):      Lack of Transportation (Non-Medical):    Physical Activity:     Days of Exercise per Week:     Minutes of Exercise per Session:    Stress:     Feeling of Stress :    Social Connections:     Frequency of Communication with Friends and Family:     Frequency of Social Gatherings with Friends and Family:     Attends Taoist Services:     Active Member of Clubs or Organizations:     Attends Club or Organization Meetings:     Marital Status:    Intimate Partner Violence:     Fear of Current or Ex-Partner:     Emotionally Abused:     Physically Abused:     Sexually Abused: Family History:     Family History   Problem Relation Age of Onset    Asperger's syndrome Mother     Colon polyps Mother     Diabetes Mother     Hyperlipidemia Mother    [employer] Hypertension Mother    [employer] Hypothyroidism Mother     Asperger's syndrome Father     Diabetes Father     Hyperlipidemia Father     Asperger's syndrome Other     Diabetes Family     Hypertension Family     Asperger's syndrome Brother       Current Medications:     Current Outpatient Medications   Medication Sig Dispense Refill    Calcium Citrate 1040 MG TABS Take 1 tablet by mouth daily      hydrocortisone-pramoxine (PROCTOFOAM-HC) rectal foam Insert 1 applicator into the rectum 2 (two) times a day (Patient taking differently: Insert 1 applicator into the rectum as needed ) 10 g 1    Multiple Vitamins-Minerals (MULTIVITAL) tablet Take 1 tablet by mouth daily       No current facility-administered medications for this visit  Allergies:     No Known Allergies   Physical Exam:     /60   Pulse 93   Temp (!) 97 1 °F (36 2 °C) (Temporal)   Resp 16   Ht 5' 4 57" (1 64 m)   Wt 77 2 kg (170 lb 3 2 oz)   SpO2 97%   BMI 28 70 kg/m²     Physical Exam  Vitals and nursing note reviewed  Constitutional:       General: She is not in acute distress  Appearance: Normal appearance  She is well-developed  HENT:      Right Ear: Tympanic membrane normal       Left Ear: Tympanic membrane normal       Nose: Nose normal       Mouth/Throat:      Mouth: Mucous membranes are moist    Eyes:      Conjunctiva/sclera: Conjunctivae normal    Neck:      Vascular: No carotid bruit  Cardiovascular:      Rate and Rhythm: Normal rate and regular rhythm  Pulses: Normal pulses  Heart sounds: No murmur heard  Pulmonary:      Effort: Pulmonary effort is normal  No respiratory distress  Breath sounds: Normal breath sounds  Abdominal:      General: Bowel sounds are normal       Palpations: Abdomen is soft        Tenderness: There is no abdominal tenderness  Musculoskeletal:      Cervical back: Neck supple  Skin:     General: Skin is warm and dry  Neurological:      Mental Status: She is alert and oriented to person, place, and time  Psychiatric:         Mood and Affect: Affect is tearful  Behavior: Behavior normal          Thought Content:  Thought content normal          Judgment: Judgment normal       Comments: Tearful at times          DO YUL[facility] Alfar[facility]

## 2021-09-09 ENCOUNTER — TELEPHONE (OUTPATIENT)
Dept: ADMINISTRATIVE | Facility: OTHER | Age: 46
End: 2021-09-09

## 2021-09-09 ENCOUNTER — TELEPHONE (OUTPATIENT)
Dept: FAMILY MEDICINE CLINIC | Facility: CLINIC | Age: 46
End: 2021-09-09

## 2021-09-09 NOTE — TELEPHONE ENCOUNTER
Upon review of the In Basket request we were able to locate, review, and update the patient chart as requested for Mammogram     Any additional questions or concerns should be emailed to the Practice Liaisons via [email or web address] email, please do not reply via In Basket      Thank you  Damion Harry

## 2021-09-09 NOTE — TELEPHONE ENCOUNTER
----- Message from Judd Graham DO sent at 0/0/4396  4:44 PM EDT -----  Regarding: lab   Patient sees Dr Pallavi Stoner  Recent visit  Also had blood work done at [facility] ordered by him    Can we call his office at [phone] an ask them to send over the repor

## 2021-09-09 NOTE — TELEPHONE ENCOUNTER
----- Message from Cheri Will sent at 9/8/2021  4:00 PM EDT -----  Regarding: mammogram  09/08/21 4:01 PM    Hello, our patient Rachelle Gautam has had Mammogram completed/performed  Please assist in updating the patient chart by pulling the document from the Media Tab  The date of service is 09/08/2021       Thank you,  Caty Loepz  [phone]

## 2021-09-09 NOTE — TELEPHONE ENCOUNTER
----- Message from Maria L Saha DO sent at 8/4/9007  4:29 PM EDT -----  Regarding: mammo  See results in media August 12

## 2021-10-24 LAB
25(OH)D3+25(OH)D2 SERPL-MCNC: 35.2 NG/ML (ref 30–100)
APPEARANCE UR: CLEAR
BACTERIA URNS QL MICRO: ABNORMAL
BILIRUB UR QL STRIP: NEGATIVE
CASTS URNS QL MICRO: ABNORMAL /LPF
CHOLEST SERPL-MCNC: 179 MG/DL (ref 100–199)
CHOLEST/HDLC SERPL: 3.7 RATIO (ref 0–4.4)
COLOR UR: YELLOW
EPI CELLS #/AREA URNS HPF: ABNORMAL /HPF (ref 0–10)
GLUCOSE UR QL: NEGATIVE
HDLC SERPL-MCNC: 49 MG/DL
HGB UR QL STRIP: ABNORMAL
KETONES UR QL STRIP: NEGATIVE
LDLC SERPL DIRECT ASSAY-MCNC: 106 MG/DL (ref 0–99)
LEUKOCYTE ESTERASE UR QL STRIP: NEGATIVE
LKM-1 AB SER-ACNC: <20.1 UNITS (ref 0–20)
MICRO URNS: ABNORMAL
NITRITE UR QL STRIP: NEGATIVE
PH UR STRIP: 5.5 [PH] (ref 5–7.5)
PROT UR QL STRIP: NEGATIVE
RBC #/AREA URNS HPF: ABNORMAL /HPF (ref 0–2)
SP GR UR: 1.03 (ref 1–1.03)
T3REVERSE SERPL-MCNC: 18.6 NG/DL (ref 9.2–24.1)
T4 FREE SERPL-MCNC: 1.06 NG/DL (ref 0.82–1.77)
TRIGL SERPL-MCNC: 108 MG/DL (ref 0–149)
TSH SERPL DL<=0.005 MIU/L-ACNC: 2.92 UIU/ML (ref 0.45–4.5)
UROBILINOGEN UR STRIP-ACNC: 0.2 MG/DL (ref 0.2–1)
VIT B12 SERPL-MCNC: 616 PG/ML (ref 232–1245)
WBC #/AREA URNS HPF: ABNORMAL /HPF (ref 0–5)

## 2022-01-14 ENCOUNTER — TELEPHONE (OUTPATIENT)
Dept: FAMILY MEDICINE CLINIC | Facility: CLINIC | Age: 47
End: 2022-01-14

## 2022-07-27 ENCOUNTER — TELEPHONE (OUTPATIENT)
Dept: FAMILY MEDICINE CLINIC | Facility: CLINIC | Age: 47
End: 2022-07-27

## 2022-07-27 NOTE — TELEPHONE ENCOUNTER
I called and spoke with the Pt and she stated she has mild symptoms at this time  She has no fever she just has moments of sweating,she has fatigue, cough and congestion and a times can get up some mucus  She would like some more information on the Paxlovid as she does not know much about it, she is also going to look into it on the [facility] site  She would like some OTC recommendations as well

## 2022-07-27 NOTE — TELEPHONE ENCOUNTER
It is up to them if they ALL want Paxlovid  Must be taken within 5 days of start of symptoms    If so, we would obviously need to put separate orders into each other charts

## 2022-07-27 NOTE — TELEPHONE ENCOUNTER
Pt's mom called the office this morning and stated they were away on vacation in [facility] and on Sunday 7/24 the Pt did not feel well fever x 1 day and congestion  Pt, her parents and brother tested Positive for COVID  Pt is vaccinated per mom

## 2022-07-28 NOTE — TELEPHONE ENCOUNTER
Pt returned the office's call she is aware of your otc recommendations  Pt has verbally expressed she is not interest in Paxlovid at this time  Pt was advised to please call if she were to develop any sob, respiratory distress  any fever not resolving or going down with tylenol  Pt expressed verbal understanding call complete

## 2022-08-02 ENCOUNTER — OFFICE VISIT (OUTPATIENT)
Dept: FAMILY MEDICINE CLINIC | Facility: CLINIC | Age: 47
End: 2022-08-02
Payer: COMMERCIAL

## 2022-08-02 VITALS
BODY MASS INDEX: 29.69 KG/M2 | HEIGHT: 65 IN | HEART RATE: 78 BPM | TEMPERATURE: 97.2 F | RESPIRATION RATE: 18 BRPM | SYSTOLIC BLOOD PRESSURE: 132 MMHG | DIASTOLIC BLOOD PRESSURE: 84 MMHG | OXYGEN SATURATION: 98 % | WEIGHT: 178.2 LBS

## 2022-08-02 DIAGNOSIS — R39.9 UTI SYMPTOMS: Primary | ICD-10-CM

## 2022-08-02 DIAGNOSIS — R05.9 COUGH: ICD-10-CM

## 2022-08-02 LAB
SL AMB  POCT GLUCOSE, UA: ABNORMAL
SL AMB LEUKOCYTE ESTERASE,UA: ABNORMAL
SL AMB POCT BILIRUBIN,UA: ABNORMAL
SL AMB POCT BLOOD,UA: ABNORMAL
SL AMB POCT CLARITY,UA: ABNORMAL
SL AMB POCT COLOR,UA: YELLOW
SL AMB POCT KETONES,UA: ABNORMAL
SL AMB POCT NITRITE,UA: ABNORMAL
SL AMB POCT PH,UA: 6
SL AMB POCT SPECIFIC GRAVITY,UA: 1.01
SL AMB POCT URINE PROTEIN: 0.15
SL AMB POCT UROBILINOGEN: 3.5

## 2022-08-02 PROCEDURE — 81002 URINALYSIS NONAUTO W/O SCOPE: CPT | Performed by: FAMILY MEDICINE

## 2022-08-02 PROCEDURE — 87186 SC STD MICRODIL/AGAR DIL: CPT | Performed by: FAMILY MEDICINE

## 2022-08-02 PROCEDURE — 99213 OFFICE O/P EST LOW 20 MIN: CPT | Performed by: FAMILY MEDICINE

## 2022-08-02 PROCEDURE — 87077 CULTURE AEROBIC IDENTIFY: CPT | Performed by: FAMILY MEDICINE

## 2022-08-02 PROCEDURE — 87086 URINE CULTURE/COLONY COUNT: CPT | Performed by: FAMILY MEDICINE

## 2022-08-02 RX ORDER — BENZONATATE 100 MG/1
100 CAPSULE ORAL 3 TIMES DAILY PRN
Qty: 20 CAPSULE | Refills: 0 | Status: SHIPPED | OUTPATIENT
Start: 2022-08-02 | End: 2022-09-14

## 2022-08-02 RX ORDER — CIPROFLOXACIN 500 MG/1
500 TABLET, FILM COATED ORAL EVERY 12 HOURS SCHEDULED
Qty: 10 TABLET | Refills: 0 | Status: SHIPPED | OUTPATIENT
Start: 2022-08-02 | End: 2022-08-07

## 2022-08-02 NOTE — PATIENT INSTRUCTIONS
Stay well hydrated and take abx as directed  Take tessalon perles as directed prn cough  Call if any problems  Recovered recently from [place] and denies fever or resp distress

## 2022-08-02 NOTE — PROGRESS NOTES
Assessment/Plan:  Chief Complaint   Patient presents with    Possible UTI     Pt states she tested positive for Covid last week she would like to discuss when she can retested again pt is having very bad cough  Her UTI symptoms started over the weekend  Patient Instructions   Stay well hydrated and take abx as directed  Take tessalon perles as directed prn cough  Call if any problems  Recovered recently from [place] 19 and denies fever or resp distress  No problem-specific Assessment & Plan notes found for this encounter  Diagnoses and all orders for this visit:    UTI symptoms  -     POCT urine dip  -     ciprofloxacin (CIPRO) 500 mg tablet; Take 1 tablet (500 mg total) by mouth every 12 (twelve) hours for 5 days    Cough  -     ciprofloxacin (CIPRO) 500 mg tablet; Take 1 tablet (500 mg total) by mouth every 12 (twelve) hours for 5 days  -     benzonatate (TESSALON PERLES) 100 mg capsule; Take 1 capsule (100 mg total) by mouth 3 (three) times a day as needed for cough    Other orders  -     TURMERIC CURCUMIN PO; Take by mouth          Subjective:      Patient ID: Nino Goode is a 52 y o  female  Possible UTI (Pt states she tested positive for Covid last week she would like to discuss when she can retested again pt is having very bad cough  Her UTI symptoms started over the weekend  )      The following portions of the patient's history were reviewed and updated as appropriate: allergies, current medications, past family history, past medical history, past social history, past surgical history and problem list     Review of Systems   Constitutional: Negative  HENT: Negative  Eyes: Negative  Respiratory: Positive for cough  Cardiovascular: Negative  Gastrointestinal: Negative  Endocrine: Negative  Genitourinary: Positive for dysuria  Musculoskeletal: Negative  Skin: Negative  Allergic/Immunologic: Negative  Neurological: Negative  Hematological: Negative  Psychiatric/Behavioral: Negative  Objective:      /84 (BP Location: Left arm, Patient Position: Sitting, Cuff Size: Adult)   Pulse 78   Temp (!) 97 2 °F (36 2 °C) (Temporal)   Resp 18   Ht 5' 5" (1 651 m)   Wt 80 8 kg (178 lb 3 2 oz)   SpO2 98%   BMI 29 65 kg/m²          Physical Exam  Constitutional:       Appearance: She is well-developed  HENT:      Head: Normocephalic and atraumatic  Eyes:      Conjunctiva/sclera: Conjunctivae normal       Pupils: Pupils are equal, round, and reactive to light  Cardiovascular:      Rate and Rhythm: Normal rate and regular rhythm  Heart sounds: Normal heart sounds  Pulmonary:      Effort: Pulmonary effort is normal       Breath sounds: Normal breath sounds  Comments: cough  Musculoskeletal:         General: Normal range of motion  Cervical back: Normal range of motion and neck supple  Skin:     General: Skin is warm and dry  Neurological:      Mental Status: She is alert and oriented to person, place, and time  Deep Tendon Reflexes: Reflexes are normal and symmetric     Psychiatric:         Behavior: Behavior normal

## 2022-08-04 LAB — BACTERIA UR CULT: ABNORMAL

## 2022-09-14 ENCOUNTER — TELEPHONE (OUTPATIENT)
Dept: ADMINISTRATIVE | Facility: OTHER | Age: 47
End: 2022-09-14

## 2022-09-14 ENCOUNTER — OFFICE VISIT (OUTPATIENT)
Dept: FAMILY MEDICINE CLINIC | Facility: CLINIC | Age: 47
End: 2022-09-14
Payer: COMMERCIAL

## 2022-09-14 VITALS
TEMPERATURE: 96.8 F | BODY MASS INDEX: 29.46 KG/M2 | OXYGEN SATURATION: 97 % | DIASTOLIC BLOOD PRESSURE: 78 MMHG | HEIGHT: 65 IN | HEART RATE: 88 BPM | RESPIRATION RATE: 16 BRPM | WEIGHT: 176.8 LBS | SYSTOLIC BLOOD PRESSURE: 122 MMHG

## 2022-09-14 DIAGNOSIS — Z83.438 FAMILY HISTORY OF HYPERLIPIDEMIA: ICD-10-CM

## 2022-09-14 DIAGNOSIS — N39.3 STRESS INCONTINENCE: ICD-10-CM

## 2022-09-14 DIAGNOSIS — Z00.00 WELL ADULT HEALTH CHECK: Primary | ICD-10-CM

## 2022-09-14 DIAGNOSIS — Z83.49 FAMILY HISTORY OF THYROID DISORDER: ICD-10-CM

## 2022-09-14 DIAGNOSIS — E53.8 VITAMIN B12 DEFICIENCY: ICD-10-CM

## 2022-09-14 DIAGNOSIS — M10.9 GOUTY ARTHRITIS: ICD-10-CM

## 2022-09-14 DIAGNOSIS — E55.9 VITAMIN D DEFICIENCY: ICD-10-CM

## 2022-09-14 PROCEDURE — 99396 PREV VISIT EST AGE 40-64: CPT | Performed by: FAMILY MEDICINE

## 2022-09-14 PROCEDURE — 3725F SCREEN DEPRESSION PERFORMED: CPT | Performed by: FAMILY MEDICINE

## 2022-09-14 NOTE — LETTER
Procedure Request Form: Cervical Cancer Screening      Date Requested: 22  Patient: Joe Okeefe  Patient : 1975   Referring Provider: Jordana Shepard, DO        Date of Procedure ______________________________       The above patient has informed us that they have completed their   most recent Cervical Cancer Screening at your facility  Please complete   this form and attach all corresponding procedure reports/results  Comments __________________________________________________________  ____________________________________________________________________  ____________________________________________________________________  ____________________________________________________________________    Facility Completing Procedure _________________________________________    Form Completed By (print name) _______________________________________      Signature __________________________________________________________      These reports are needed for  compliance  Please fax this completed form and a copy of the procedure report to our office located at [place] [address] as soon as possible to [phone] attention Worthy Nicole: Phone [phone]    We thank you for your assistance in treating our mutual patient

## 2022-09-14 NOTE — TELEPHONE ENCOUNTER
Upon review of the In Basket request and the patient's chart, initial outreach has been made via fax, please see Contacts section for details       Thank you  Vincenzo Hernandez

## 2022-09-14 NOTE — LETTER
Procedure Request Form: Cervical Cancer Screening      Date Requested: 22  Patient: Ann Atkins  Patient : 1975   Referring Provider: Dipika Garner, DO        Date of Procedure ______________________________       The above patient has informed us that they have completed their   most recent Cervical Cancer Screening at your facility  Please complete   this form and attach all corresponding procedure reports/results  Comments __________________________________________________________  ____________________________________________________________________  ____________________________________________________________________  ____________________________________________________________________    Facility Completing Procedure _________________________________________    Form Completed By (print name) _______________________________________      Signature __________________________________________________________      These reports are needed for  compliance  Please fax this completed form and a copy of the procedure report to our office located at [place] [address] as soon as possible to [phone] anne Randle: Phone [phone]    We thank you for your assistance in treating our mutual patient

## 2022-09-14 NOTE — PROGRESS NOTES
[address] [facility]    NAME: Priscilla Benz  AGE: 52 y o  SEX: female  : 1975     DATE: 2022     Assessment and Plan:   Priscilla Yan was seen today for well check  Diagnoses and all orders for this visit:    Well adult health check  -     Vitamin D 25 hydroxy; Future  -     Vitamin B12; Future  -     Lipid Panel with Direct LDL reflex; Future  -     Comprehensive metabolic panel; Future  -     T3, free; Future  -     T4, free; Future  -     TSH, 3rd generation; Future    Vitamin D deficiency  -     Vitamin D 25 hydroxy; Future    Vitamin B12 deficiency  -     Vitamin B12; Future    Family history of thyroid disorder  -     T3, free; Future  -     T4, free; Future  -     TSH, 3rd generation; Future    Family history of hyperlipidemia  -     Lipid Panel with Direct LDL reflex; Future    Stress incontinence  -     Ambulatory Referral to Urogynecology; Future    Gouty arthritis  -     Uric acid; Future      Problem List Items Addressed This Visit        Other    Vitamin B12 deficiency    Relevant Orders    Vitamin B12    Vitamin D deficiency    Relevant Orders    Vitamin D 25 hydroxy      Other Visit Diagnoses     Well adult health check    -  Primary    Relevant Orders    Vitamin D 25 hydroxy    Vitamin B12    Lipid Panel with Direct LDL reflex    Comprehensive metabolic panel    T3, free    T4, free    TSH, 3rd generation    Family history of thyroid disorder        Relevant Orders    T3, free    T4, free    TSH, 3rd generation    Family history of hyperlipidemia        Relevant Orders    Lipid Panel with Direct LDL reflex    Stress incontinence        Relevant Orders    Ambulatory Referral to Urogynecology    Gouty arthritis        Relevant Orders    Uric acid          Immunizations and preventive care screenings were discussed with patient today   Appropriate education was printed on patient's after visit summary  Counseling:  Alcohol/drug use: discussed moderation in alcohol intake, the recommendations for healthy alcohol use  Dental Health: discussed importance of regular tooth brushing, flossing, and dental visits  Injury prevention: discussed safety/seat belts, safety helmets, smoke detectors, carbon dioxide detectors, and smoking near bedding or upholstery  Sexual health:   · Exercise: the importance of regular exercise/physical activity was discussed  Recommend exercise 3-5 times per week for at least 30 minutes  BMI Counseling: Body mass index is 29 42 kg/m²  The BMI is above normal  Nutrition recommendations include decreasing portion sizes, encouraging healthy choices of fruits and vegetables, decreasing fast food intake, consuming healthier snacks, limiting drinks that contain sugar, moderation in carbohydrate intake, increasing intake of lean protein, reducing intake of saturated and trans fat and reducing intake of cholesterol  Exercise recommendations include exercising 3-5 times per week  Patient referred to PCP  Rationale for BMI follow-up plan is due to patient being overweight or obese  Depression Screening and Follow-up Plan: Patient was screened for depression during today's encounter  They screened negative with a PHQ-2 score of 0  Return in about 1 year (around 9/14/2023) for Annual physical      Chief Complaint:     Chief Complaint   Patient presents with    Well Check     Pt states she will call and make appt w/ gyn pap       History of Present Illness:     Adult Annual Physical   Patient here for a comprehensive physical exam  The patient reports problems - stress with  her 9year old acting out  Diet and Physical Activity  · Diet/Nutrition: well balanced diet  · Exercise: strength training exercises, 3-4 times a week on average and some cardio        Depression Screening  PHQ-2/9 Depression Screening    Little interest or pleasure in doing things: 0 - not at all  Feeling down, depressed, or hopeless: 0 - not at all  PHQ-2 Score: 0  PHQ-2 Interpretation: Negative depression screen       General Health  · Sleep: variable  · Hearing: normal - bilateral   · Vision: most recent eye exam <1 year ago and wears glasses  · Dental: regular dental visits         /GYN Health  · Patient is: premenopausal  · Last menstrual period: regular  · Contraceptive method: NA      Review of Systems:     Review of Systems   Gastrointestinal:        ? rectocele   Genitourinary:        Urine leaking--- stress   Musculoskeletal:        "burning sensation" in big toe BOTH sides, also tore ACL, saw ortho, also plantar fascitiis   Psychiatric/Behavioral:        Divorce stress      Past Medical History:     Past Medical History:   Diagnosis Date    Automatic atrial tachycardia ([address] )     Palpitations     last assessed 10/12/2012      Past Surgical History:     Past Surgical History:   Procedure Laterality Date    ABDOMINOPLASTY      ATRIAL ABLATION SURGERY      atrial tachycardia      Social History:     Social History     Socioeconomic History    Marital status: /Civil Union     Spouse name: None    Number of children: None    Years of education: None    Highest education level: None   Occupational History    None   Tobacco Use    Smoking status: Never Smoker    Smokeless tobacco: Never Used   Substance and Sexual Activity    Alcohol use: Never    Drug use: Never    Sexual activity: Not Currently   Other Topics Concern    None   Social History Narrative    Trying to divorce     Social Determinants of Health     Financial Resource Strain: Not on file   Food Insecurity: Not on file   Transportation Needs: Not on file   Physical Activity: Not on file   Stress: Not on file   Social Connections: Not on file   Intimate Partner Violence: Not on file   Housing Stability: Not on file      Family History:     Family History   Problem Relation Age of Onset    Asperger's syndrome Mother     Colon polyps Mother     Diabetes Mother     Hyperlipidemia Mother    Mavis Cousin Hypertension Mother     Hypothyroidism Mother     Asperger's syndrome Father     Diabetes Father     Hyperlipidemia Father     Asperger's syndrome Other     Diabetes Family     Hypertension Family     Asperger's syndrome Brother       Current Medications:     Current Outpatient Medications   Medication Sig Dispense Refill    Calcium Citrate 1040 MG TABS Take 1 tablet by mouth daily      hydrocortisone-pramoxine (PROCTOFOAM-HC) rectal foam Insert 1 applicator into the rectum 2 (two) times a day 10 g 1    Multiple Vitamins-Minerals (MULTIVITAL) tablet Take 1 tablet by mouth daily      TURMERIC CURCUMIN PO Take by mouth       No current facility-administered medications for this visit  Allergies:     No Known Allergies   Physical Exam:     /78   Pulse 88   Temp (!) 96 8 °F (36 °C) (Temporal)   Resp 16   Ht 5' 5" (1 651 m)   Wt 80 2 kg (176 lb 12 8 oz)   SpO2 97%   BMI 29 42 kg/m²     Physical Exam  Vitals and nursing note reviewed  Constitutional:       General: She is not in acute distress  Appearance: She is well-developed  HENT:      Head: Normocephalic and atraumatic  Right Ear: Tympanic membrane normal       Left Ear: Tympanic membrane normal       Nose: Nose normal       Mouth/Throat:      Mouth: Mucous membranes are moist    Eyes:      Pupils: Pupils are equal, round, and reactive to light  Neck:      Vascular: No carotid bruit  Cardiovascular:      Rate and Rhythm: Normal rate and regular rhythm  Heart sounds: No murmur heard  Pulmonary:      Effort: Pulmonary effort is normal  No respiratory distress  Breath sounds: Normal breath sounds  Abdominal:      General: Bowel sounds are normal       Palpations: Abdomen is soft  Tenderness: There is no abdominal tenderness  Musculoskeletal:      Cervical back: Neck supple     Skin:     General: Skin is warm and dry    Neurological:      Mental Status: She is alert and oriented to person, place, and time  Psychiatric:         Mood and Affect: Mood normal          Behavior: Behavior normal          Thought Content:  Thought content normal          Judgment: Judgment normal           DO YULIA Gu [facility]

## 2022-09-14 NOTE — TELEPHONE ENCOUNTER
----- Message from Renetta Patel DO sent at 5/74/9712 11:07 AM EDT -----  Regarding: care gap request  09/14/22 11:07 AM    Hello, our patient attached above has had Pap Smear (HPV) aka Cervical Cancer Screening completed/performed  Please assist in updating the patient chart by making an External outreach to [facility] facility located in [place]  The date of service is 2022      Thank you,  Renetta Patel DO  [facility]

## 2022-09-19 NOTE — TELEPHONE ENCOUNTER
As a follow-up, a second attempt has been made for outreach via fax, please see Contacts section for details       [address] [place]  [phone]    Thank you  Kandi Arana

## 2022-09-22 NOTE — TELEPHONE ENCOUNTER
Upon review of the In Basket request we were able to locate, review, and update the patient chart as requested for Pap Smear (HPV) aka Cervical Cancer Screening  Any additional questions or concerns should be emailed to the Practice Liaisons via [email or web address] email, please do not reply via In Basket      Thank you  Celine Iqbal

## 2022-10-08 LAB
25(OH)D3+25(OH)D2 SERPL-MCNC: 48 NG/ML (ref 30–100)
ALBUMIN SERPL-MCNC: 4.1 G/DL (ref 3.8–4.8)
ALBUMIN/GLOB SERPL: 1.8 {RATIO} (ref 1.2–2.2)
ALP SERPL-CCNC: 50 IU/L (ref 44–121)
ALT SERPL-CCNC: 20 IU/L (ref 0–32)
AST SERPL-CCNC: 22 IU/L (ref 0–40)
BILIRUB SERPL-MCNC: 0.6 MG/DL (ref 0–1.2)
BUN SERPL-MCNC: 11 MG/DL (ref 6–24)
BUN/CREAT SERPL: 12 (ref 9–23)
CALCIUM SERPL-MCNC: 9 MG/DL (ref 8.7–10.2)
CHLORIDE SERPL-SCNC: 101 MMOL/L (ref 96–106)
CHOLEST SERPL-MCNC: 177 MG/DL (ref 100–199)
CO2 SERPL-SCNC: 24 MMOL/L (ref 20–29)
CREAT SERPL-MCNC: 0.92 MG/DL (ref 0.57–1)
EGFR: 77 ML/MIN/1.73
GLOBULIN SER-MCNC: 2.3 G/DL (ref 1.5–4.5)
GLUCOSE SERPL-MCNC: 95 MG/DL (ref 70–99)
HDLC SERPL-MCNC: 40 MG/DL
LDLC SERPL CALC-MCNC: 112 MG/DL (ref 0–99)
POTASSIUM SERPL-SCNC: 4.4 MMOL/L (ref 3.5–5.2)
PROT SERPL-MCNC: 6.4 G/DL (ref 6–8.5)
SODIUM SERPL-SCNC: 138 MMOL/L (ref 134–144)
T3FREE SERPL-MCNC: 3.1 PG/ML (ref 2–4.4)
T4 FREE SERPL-MCNC: 1.14 NG/DL (ref 0.82–1.77)
TRIGL SERPL-MCNC: 140 MG/DL (ref 0–149)
TSH SERPL DL<=0.005 MIU/L-ACNC: 2.92 UIU/ML (ref 0.45–4.5)
URATE SERPL-MCNC: 5 MG/DL (ref 2.6–6.2)
VIT B12 SERPL-MCNC: 536 PG/ML (ref 232–1245)

## 2022-12-22 ENCOUNTER — TELEPHONE (OUTPATIENT)
Dept: FAMILY MEDICINE CLINIC | Facility: CLINIC | Age: 47
End: 2022-12-22

## 2022-12-22 NOTE — TELEPHONE ENCOUNTER
Christi Fitzgerald called the office to schedule a preop clearance for an upcoming surgery prospectively scheduled in early February  Date is not yet set  I advised pt to please call ASAP once date is confirmed  I informed pt pre op needs to be within 30 days of surgery  Pt will need  ekg we can do here  Pt will need blwk as pat's

## 2023-01-30 ENCOUNTER — ANESTHESIA EVENT (OUTPATIENT)
Dept: PERIOP | Facility: HOSPITAL | Age: 48
End: 2023-01-30

## 2023-01-30 NOTE — PRE-PROCEDURE INSTRUCTIONS
Pre-Surgery Instructions:   Medication Instructions   • Calcium Citrate 1040 MG TABS Stop taking 7 days prior to surgery  • Cholecalciferol (VITAMIN D3 PO) Stop taking 7 days prior to surgery  • Collagen-Boron-Hyaluronic Acid (MOVE FREE ULTRA JOINT HEALTH PO) Stop taking 7 days prior to surgery  • Flaxseed, Linseed, (FLAXSEED OIL PO) Stop taking 7 days prior to surgery  • hydrocortisone-pramoxine (PROCTOFOAM-HC) rectal foam Stop taking 7 days prior to surgery  • Multiple Vitamins-Minerals (MULTIVITAL) tablet Stop taking 7 days prior to surgery  • NON FORMULARY Stop taking 7 days prior to surgery  • Specialty Vitamins Products (ADVANCED COLLAGEN PO) Stop taking 7 days prior to surgery  • TART CHERRY PO Stop taking 7 days prior to surgery  • TURMERIC CURCUMIN PO Stop taking 7 days prior to surgery  - Reviewed with patient, in detail, instructions from "My Surgical Experience"  - Instructed to avoid all OTC vitamins/supplements and NSAIDS for one week prior to surgery per anesthesia guidelines  Tylenol ok to take PRN  - Advised patient nothing eat or drink after midnight prior to surgery, except medications that he/she is to take morning DOS with only small sip of water     - Advised patient that Eder Miles will call with surgery arrival time and hospital directions the business day prior to surgery  Patient verbalized understanding of current visitor restrictions/masking guidelines and advised that he/she can confirm these at time of arrival call with Eder Miles      - Patient verbalized understanding and knows to call surgeon's office with any additional questions prior to surgery  Instructed to call surgeon's office in meantime with any new illnesses/exposure, patient verbalized understanding

## 2023-01-31 ENCOUNTER — OFFICE VISIT (OUTPATIENT)
Dept: FAMILY MEDICINE CLINIC | Facility: CLINIC | Age: 48
End: 2023-01-31

## 2023-01-31 VITALS
SYSTOLIC BLOOD PRESSURE: 120 MMHG | TEMPERATURE: 97 F | HEART RATE: 100 BPM | HEIGHT: 65 IN | DIASTOLIC BLOOD PRESSURE: 80 MMHG | OXYGEN SATURATION: 97 % | WEIGHT: 177 LBS | BODY MASS INDEX: 29.49 KG/M2

## 2023-01-31 DIAGNOSIS — N39.3 STRESS INCONTINENCE: ICD-10-CM

## 2023-01-31 DIAGNOSIS — N36.41 HYPERMOBILITY OF URETHRA: ICD-10-CM

## 2023-01-31 DIAGNOSIS — N81.6 RECTOCELE: ICD-10-CM

## 2023-01-31 DIAGNOSIS — Z01.818 PRE-OP EXAMINATION: Primary | ICD-10-CM

## 2023-01-31 NOTE — PROGRESS NOTES
Chief Complaint   Patient presents with   • Pre-op Exam     Pt having surgery with Dr Gricelda Horn on 2/6      Subjective:      Anai Spain is a 52 y o  female who presents to the office today for a preoperative consultation at the request of surgeon Dr Gricelda Horn who plans on performing    Case: [id number] Date/Time: 02/06/23    Procedures:    [address]; POSTERIOR COLPORRHAPHY (Perineum)       PUBOVAGINAL SLING (Vagina )       CYSTOSCOPY (Bladder)   Anesthesia type: IV Sedation with Anesthesia   Diagnosis:        Rectocele [N81 6]       Hypermobility of urethra [N36 41]       Stress incontinence (female) (male) [N39 3]   Pre-op diagnosis:        Rectocele [N81 6]       Hypermobility of urethra [N36 41]       Stress incontinence (female) (male) [N39 3]   [facility]   Surgeons: Maxime Boo MD       Planned anesthesia is IV sedation  The patient has the following known anesthesia issues: NA  Patient has a bleeding risk of: no recent abnormal bleeding  Review of Systems  Review of Systems   Genitourinary:        Stress incontinence, cystocele rectocele symptoms   All other systems reviewed and are negative  EKG in the office today unremarkable  EKG: normal EKG, normal sinus rhythm  PAT labs reviewed unremarkable CBC and chemistry   Objective:      Physical Exam    /80   Pulse 100   Temp (!) 97 °F (36 1 °C) (Temporal)   Ht 5' 5" (1 651 m)   Wt 80 3 kg (177 lb)   LMP 12/28/2022 (Exact Date)   SpO2 97%   BMI 29 45 kg/m²     Physical Exam  Vitals reviewed  Constitutional:       General: She is not in acute distress  Appearance: Normal appearance  She is well-developed  HENT:      Mouth/Throat:      Mouth: Mucous membranes are moist    Eyes:      Pupils: Pupils are equal, round, and reactive to light  Cardiovascular:      Rate and Rhythm: Normal rate and regular rhythm  Heart sounds: No murmur heard    Pulmonary:      Effort: Pulmonary effort is normal  Breath sounds: Normal breath sounds  Abdominal:      General: Bowel sounds are normal       Palpations: Abdomen is soft  There is no mass  Tenderness: There is no abdominal tenderness  Musculoskeletal:         General: Normal range of motion  Skin:     General: Skin is warm and dry  Neurological:      Mental Status: She is alert and oriented to person, place, and time  Deep Tendon Reflexes: Reflexes are normal and symmetric  Psychiatric:         Mood and Affect: Mood normal          Behavior: Behavior normal          Thought Content: Thought content normal          Judgment: Judgment normal             Predictors of intubation difficulty:  Morbid obesity? no  Anatomically abnormal facies? no  Short, thick neck? no  Neck range of motion: normal       Assessment:      52 y o  female with planned surgery as above  Known risk factors for perioperative complications: None    Difficulty with intubation is not anticipated  Can walk 2 blocks without difficulty:yes  Can walk up 2 flights of stairs without difficulty: yes     1  Pre-op examination  POCT ECG      2  Rectocele        3  Hypermobility of urethra        4  Stress incontinence               Plan:          Patient is medically cleared for surgery  Patient has no known allergies    Past Medical History:   Diagnosis Date   • Automatic atrial tachycardia ([address] )    • Irregular heart beat     resolved   • Mitral valve prolapse     resolved   • Palpitations     last assessed 10/12/2012   • PONV (postoperative nausea and vomiting)      Past Surgical History:   Procedure Laterality Date   • ABDOMINOPLASTY     • ATRIAL ABLATION SURGERY      atrial tachycardia   • CARDIAC CATHETERIZATION      1993 and 1995   • COLONOSCOPY     • HEMORROIDECTOMY  2010   • HERNIA REPAIR       Patient Active Problem List   Diagnosis   • Female stress incontinence   • Situational anxiety   • Umbilical hernia   • Vitamin B12 deficiency   • Vitamin D deficiency • Chronic fatigue     Social History     Socioeconomic History   • Marital status:      Spouse name: None   • Number of children: None   • Years of education: None   • Highest education level: None   Occupational History   • None   Tobacco Use   • Smoking status: Never   • Smokeless tobacco: Never   Vaping Use   • Vaping Use: Never used   Substance and Sexual Activity   • Alcohol use: Yes     Comment: monthly   • Drug use: Never   • Sexual activity: Not Currently   Other Topics Concern   • None   Social History Narrative    Trying to divorce     Social Determinants of Health     Financial Resource Strain: Not on file   Food Insecurity: Not on file   Transportation Needs: Not on file   Physical Activity: Not on file   Stress: Not on file   Social Connections: Not on file   Intimate Partner Violence: Not on file   Housing Stability: Not on file       Current Outpatient Medications:   •  Calcium Citrate 1040 MG TABS, Take 1 tablet by mouth daily, Disp: , Rfl:   •  Cholecalciferol (VITAMIN D3 PO), Vitamin D3, Disp: , Rfl:   •  Collagen-Boron-Hyaluronic Acid (MOVE FREE ULTRA JOINT HEALTH PO), Take by mouth, Disp: , Rfl:   •  Flaxseed, Linseed, (FLAXSEED OIL PO), Flaxseed Oil, Disp: , Rfl:   •  hydrocortisone-pramoxine (PROCTOFOAM-HC) rectal foam, Insert 1 applicator into the rectum 2 (two) times a day (Patient taking differently: Insert 1 applicator into the rectum as needed), Disp: 10 g, Rfl: 1  •  Multiple Vitamin (MULTIVITAMIN PO), Multivitamin, Disp: , Rfl:   •  Multiple Vitamins-Minerals (MULTIVITAL) tablet, Take 1 tablet by mouth daily, Disp: , Rfl:   •  NON FORMULARY, every morning ADRENEVIVE - supplement, Disp: , Rfl:   •  PARAGARD INTRAUTERINE COPPER IU, Paragard Intrauterine Copper, Disp: , Rfl:   •  Specialty Vitamins Products (ADVANCED COLLAGEN PO), Take by mouth, Disp: , Rfl:   •  TART CHERRY PO, Tart Bradley, Disp: , Rfl:   •  TURMERIC CURCUMIN PO, Take by mouth, Disp: , Rfl:   Lab Results Component Value Date    WBC 0-5 07/29/2017    WBC 4 5 07/29/2017    HGB 14 1 07/29/2017    HCT 41 6 07/29/2017    MCV 85 07/29/2017     07/29/2017     Lab Results   Component Value Date     07/29/2017    K 4 4 10/07/2022     10/07/2022    CO2 24 10/07/2022    BUN 11 10/07/2022    CREATININE 0 92 10/07/2022    GLUCOSE 97 07/29/2017    CALCIUM 9 3 07/29/2017    AST 22 10/07/2022    ALT 20 10/07/2022    ALKPHOS 41 07/29/2017    PROT 6 7 07/29/2017    BILITOT 0 5 07/29/2017    EGFR 77 10/07/2022     No results found for: CELY        [unfilled]    Current Outpatient Medications:   •  Calcium Citrate 1040 MG TABS, Take 1 tablet by mouth daily, Disp: , Rfl:   •  Cholecalciferol (VITAMIN D3 PO), Vitamin D3, Disp: , Rfl:   •  Collagen-Boron-Hyaluronic Acid (MOVE FREE [employer] JOINT HEALTH PO), Take by mouth, Disp: , Rfl:   •  Flaxseed, Linseed, (FLAXSEED OIL PO), Flaxseed Oil, Disp: , Rfl:   •  hydrocortisone-pramoxine (PROCTOFOAM-HC) rectal foam, Insert 1 applicator into the rectum 2 (two) times a day (Patient taking differently: Insert 1 applicator into the rectum as needed), Disp: 10 g, Rfl: 1  •  Multiple Vitamin (MULTIVITAMIN PO), Multivitamin, Disp: , Rfl:   •  Multiple Vitamins-Minerals (MULTIVITAL) tablet, Take 1 tablet by mouth daily, Disp: , Rfl:   •  NON FORMULARY, every morning ADRENEVIVE - supplement, Disp: , Rfl:   •  PARAGARD INTRAUTERINE COPPER IU, Paragard Intrauterine Copper, Disp: , Rfl:   •  Specialty Vitamins Products (ADVANCED COLLAGEN PO), Take by mouth, Disp: , Rfl:   •  TART CHERRY PO, Tart Bradley, Disp: , Rfl:   •  TURMERIC CURCUMIN PO, Take by mouth, Disp: , Rfl:   No Known Allergies

## 2023-02-06 ENCOUNTER — ANESTHESIA (OUTPATIENT)
Dept: PERIOP | Facility: HOSPITAL | Age: 48
End: 2023-02-06

## 2023-02-06 ENCOUNTER — HOSPITAL ENCOUNTER (OUTPATIENT)
Facility: HOSPITAL | Age: 48
Setting detail: OUTPATIENT SURGERY
Discharge: HOME | End: 2023-02-06
Attending: OBSTETRICS & GYNECOLOGY | Admitting: OBSTETRICS & GYNECOLOGY

## 2023-02-06 VITALS
OXYGEN SATURATION: 100 % | SYSTOLIC BLOOD PRESSURE: 111 MMHG | HEIGHT: 65 IN | RESPIRATION RATE: 12 BRPM | TEMPERATURE: 99 F | DIASTOLIC BLOOD PRESSURE: 74 MMHG | HEART RATE: 74 BPM | WEIGHT: 181.88 LBS | BODY MASS INDEX: 30.3 KG/M2

## 2023-02-06 DIAGNOSIS — G89.18 POST-OP PAIN: Primary | ICD-10-CM

## 2023-02-06 LAB
EXT PREGNANCY TEST URINE: NEGATIVE
EXT PREGNANCY TEST URINE: NEGATIVE
EXT. CONTROL: NORMAL
EXT. CONTROL: NORMAL

## 2023-02-06 DEVICE — SINGLE INCISION SLING SYSTEM
Type: IMPLANTABLE DEVICE | Status: FUNCTIONAL
Brand: ALTIS

## 2023-02-06 RX ORDER — DEXMEDETOMIDINE HYDROCHLORIDE 100 UG/ML
INJECTION, SOLUTION INTRAVENOUS AS NEEDED
Status: DISCONTINUED | OUTPATIENT
Start: 2023-02-06 | End: 2023-02-06

## 2023-02-06 RX ORDER — LIDOCAINE HYDROCHLORIDE 10 MG/ML
INJECTION, SOLUTION EPIDURAL; INFILTRATION; INTRACAUDAL; PERINEURAL AS NEEDED
Status: DISCONTINUED | OUTPATIENT
Start: 2023-02-06 | End: 2023-02-06

## 2023-02-06 RX ORDER — MIDAZOLAM HYDROCHLORIDE 2 MG/2ML
INJECTION, SOLUTION INTRAMUSCULAR; INTRAVENOUS AS NEEDED
Status: DISCONTINUED | OUTPATIENT
Start: 2023-02-06 | End: 2023-02-06

## 2023-02-06 RX ORDER — DOCUSATE SODIUM 100 MG/1
100 CAPSULE, LIQUID FILLED ORAL 2 TIMES DAILY
Refills: 0
Start: 2023-02-06

## 2023-02-06 RX ORDER — PROPOFOL 10 MG/ML
INJECTION, EMULSION INTRAVENOUS CONTINUOUS PRN
Status: DISCONTINUED | OUTPATIENT
Start: 2023-02-06 | End: 2023-02-06

## 2023-02-06 RX ORDER — KETOROLAC TROMETHAMINE 30 MG/ML
INJECTION, SOLUTION INTRAMUSCULAR; INTRAVENOUS AS NEEDED
Status: DISCONTINUED | OUTPATIENT
Start: 2023-02-06 | End: 2023-02-06

## 2023-02-06 RX ORDER — GLYCOPYRROLATE 0.2 MG/ML
INJECTION INTRAMUSCULAR; INTRAVENOUS AS NEEDED
Status: DISCONTINUED | OUTPATIENT
Start: 2023-02-06 | End: 2023-02-06

## 2023-02-06 RX ORDER — CEFAZOLIN SODIUM 2 G/50ML
SOLUTION INTRAVENOUS AS NEEDED
Status: DISCONTINUED | OUTPATIENT
Start: 2023-02-06 | End: 2023-02-06

## 2023-02-06 RX ORDER — SENNOSIDES 8.6 MG
650 CAPSULE ORAL EVERY 8 HOURS PRN
Qty: 30 TABLET | Refills: 0
Start: 2023-02-06

## 2023-02-06 RX ORDER — GABAPENTIN 400 MG/1
400 CAPSULE ORAL ONCE
Status: COMPLETED | OUTPATIENT
Start: 2023-02-06 | End: 2023-02-06

## 2023-02-06 RX ORDER — HYDROMORPHONE HCL/PF 1 MG/ML
0.5 SYRINGE (ML) INJECTION
Status: DISCONTINUED | OUTPATIENT
Start: 2023-02-06 | End: 2023-02-06 | Stop reason: HOSPADM

## 2023-02-06 RX ORDER — FENTANYL CITRATE 50 UG/ML
INJECTION, SOLUTION INTRAMUSCULAR; INTRAVENOUS AS NEEDED
Status: DISCONTINUED | OUTPATIENT
Start: 2023-02-06 | End: 2023-02-06

## 2023-02-06 RX ORDER — ACETAMINOPHEN 325 MG/1
975 TABLET ORAL ONCE
Status: COMPLETED | OUTPATIENT
Start: 2023-02-06 | End: 2023-02-06

## 2023-02-06 RX ORDER — CEFAZOLIN SODIUM 2 G/50ML
2000 SOLUTION INTRAVENOUS ONCE
Status: DISCONTINUED | OUTPATIENT
Start: 2023-02-06 | End: 2023-02-06 | Stop reason: HOSPADM

## 2023-02-06 RX ORDER — IBUPROFEN 600 MG/1
600 TABLET ORAL EVERY 6 HOURS PRN
Qty: 30 TABLET | Refills: 0
Start: 2023-02-06

## 2023-02-06 RX ORDER — ONDANSETRON 2 MG/ML
4 INJECTION INTRAMUSCULAR; INTRAVENOUS EVERY 6 HOURS PRN
Status: DISCONTINUED | OUTPATIENT
Start: 2023-02-06 | End: 2023-02-06 | Stop reason: HOSPADM

## 2023-02-06 RX ORDER — ACETAMINOPHEN 325 MG/1
650 TABLET ORAL EVERY 6 HOURS PRN
Status: DISCONTINUED | OUTPATIENT
Start: 2023-02-06 | End: 2023-02-06 | Stop reason: HOSPADM

## 2023-02-06 RX ORDER — IBUPROFEN 600 MG/1
600 TABLET ORAL EVERY 6 HOURS PRN
Status: DISCONTINUED | OUTPATIENT
Start: 2023-02-06 | End: 2023-02-06 | Stop reason: HOSPADM

## 2023-02-06 RX ORDER — ONDANSETRON 2 MG/ML
4 INJECTION INTRAMUSCULAR; INTRAVENOUS ONCE AS NEEDED
Status: DISCONTINUED | OUTPATIENT
Start: 2023-02-06 | End: 2023-02-06 | Stop reason: HOSPADM

## 2023-02-06 RX ORDER — ONDANSETRON 2 MG/ML
INJECTION INTRAMUSCULAR; INTRAVENOUS AS NEEDED
Status: DISCONTINUED | OUTPATIENT
Start: 2023-02-06 | End: 2023-02-06

## 2023-02-06 RX ORDER — SODIUM CHLORIDE, SODIUM LACTATE, POTASSIUM CHLORIDE, CALCIUM CHLORIDE 600; 310; 30; 20 MG/100ML; MG/100ML; MG/100ML; MG/100ML
125 INJECTION, SOLUTION INTRAVENOUS CONTINUOUS
Status: DISCONTINUED | OUTPATIENT
Start: 2023-02-06 | End: 2023-02-06 | Stop reason: HOSPADM

## 2023-02-06 RX ORDER — PROPOFOL 10 MG/ML
INJECTION, EMULSION INTRAVENOUS AS NEEDED
Status: DISCONTINUED | OUTPATIENT
Start: 2023-02-06 | End: 2023-02-06

## 2023-02-06 RX ORDER — MAGNESIUM HYDROXIDE 1200 MG/15ML
LIQUID ORAL AS NEEDED
Status: DISCONTINUED | OUTPATIENT
Start: 2023-02-06 | End: 2023-02-06 | Stop reason: HOSPADM

## 2023-02-06 RX ORDER — FENTANYL CITRATE/PF 50 MCG/ML
25 SYRINGE (ML) INJECTION
Status: DISCONTINUED | OUTPATIENT
Start: 2023-02-06 | End: 2023-02-06 | Stop reason: HOSPADM

## 2023-02-06 RX ADMIN — SODIUM CHLORIDE, SODIUM LACTATE, POTASSIUM CHLORIDE, AND CALCIUM CHLORIDE: .6; .31; .03; .02 INJECTION, SOLUTION INTRAVENOUS at 15:34

## 2023-02-06 RX ADMIN — ACETAMINOPHEN 975 MG: 325 TABLET ORAL at 10:38

## 2023-02-06 RX ADMIN — DEXMEDETOMIDINE HCL 8 MCG: 100 INJECTION INTRAVENOUS at 14:29

## 2023-02-06 RX ADMIN — DEXMEDETOMIDINE HCL 8 MCG: 100 INJECTION INTRAVENOUS at 14:26

## 2023-02-06 RX ADMIN — GABAPENTIN 400 MG: 400 CAPSULE ORAL at 10:39

## 2023-02-06 RX ADMIN — PROPOFOL 20 MG: 10 INJECTION, EMULSION INTRAVENOUS at 14:24

## 2023-02-06 RX ADMIN — DEXMEDETOMIDINE HCL 8 MCG: 100 INJECTION INTRAVENOUS at 14:35

## 2023-02-06 RX ADMIN — DEXMEDETOMIDINE HCL 8 MCG: 100 INJECTION INTRAVENOUS at 14:22

## 2023-02-06 RX ADMIN — LIDOCAINE HYDROCHLORIDE 100 MG: 10 INJECTION, SOLUTION EPIDURAL; INFILTRATION; INTRACAUDAL; PERINEURAL at 14:24

## 2023-02-06 RX ADMIN — SODIUM CHLORIDE, SODIUM LACTATE, POTASSIUM CHLORIDE, AND CALCIUM CHLORIDE 125 ML/HR: .6; .31; .03; .02 INJECTION, SOLUTION INTRAVENOUS at 10:51

## 2023-02-06 RX ADMIN — PROPOFOL 60 MCG/KG/MIN: 10 INJECTION, EMULSION INTRAVENOUS at 14:24

## 2023-02-06 RX ADMIN — ONDANSETRON 4 MG: 2 INJECTION INTRAMUSCULAR; INTRAVENOUS at 14:27

## 2023-02-06 RX ADMIN — KETOROLAC TROMETHAMINE 30 MG: 30 INJECTION, SOLUTION INTRAMUSCULAR at 15:35

## 2023-02-06 RX ADMIN — PROPOFOL 20 MG: 10 INJECTION, EMULSION INTRAVENOUS at 14:27

## 2023-02-06 RX ADMIN — FENTANYL CITRATE 50 MCG: 50 INJECTION INTRAMUSCULAR; INTRAVENOUS at 15:06

## 2023-02-06 RX ADMIN — FENTANYL CITRATE 25 MCG: 50 INJECTION INTRAMUSCULAR; INTRAVENOUS at 14:24

## 2023-02-06 RX ADMIN — GLYCOPYRROLATE 0.1 MG: 0.2 INJECTION INTRAMUSCULAR; INTRAVENOUS at 14:22

## 2023-02-06 RX ADMIN — CEFAZOLIN SODIUM 2000 MG: 2 SOLUTION INTRAVENOUS at 14:21

## 2023-02-06 RX ADMIN — MIDAZOLAM 2 MG: 1 INJECTION INTRAMUSCULAR; INTRAVENOUS at 14:22

## 2023-02-06 RX ADMIN — FENTANYL CITRATE 25 MCG: 50 INJECTION INTRAMUSCULAR; INTRAVENOUS at 14:43

## 2023-02-06 RX ADMIN — DEXMEDETOMIDINE HCL 8 MCG: 100 INJECTION INTRAVENOUS at 14:32

## 2023-02-06 NOTE — ANESTHESIA POSTPROCEDURE EVALUATION
Post-Op Assessment Note    CV Status:  Stable  Pain Score: 0    Pain management: adequate     Mental Status:  Alert and awake   Hydration Status:  Euvolemic   PONV Controlled:  Controlled   Airway Patency:  Patent   Two or more mitigation strategies used for obstructive sleep apnea   Post Op Vitals Reviewed: Yes      Staff: Anesthesiologist, CRNA         No notable events documented      BP      Temp 97 7 °F (36 5 °C) (02/06/23 1631)    Pulse 66 (02/06/23 1631)   Resp 12 (02/06/23 1631)    SpO2 95 % (02/06/23 1631)

## 2023-02-06 NOTE — DISCHARGE INSTR - AVS FIRST PAGE
Urethral Sling Procedure   WHAT YOU NEED TO KNOW:   A bladder sling procedure is surgery to treat urinary incontinence in women  The sling acts as a hammock to keep your urethra in place and hold it closed when your bladder is full  You may have vaginal bleeding or discharge for up to a week after your surgery  Use sanitary pads  Do not use tampons  You may have some pelvic discomfort or trouble urinating  DISCHARGE INSTRUCTIONS:   Call 911 for any of the following: You have sudden trouble breathing  Seek care immediately if:   Your bleeding gets worse  You have yellow or foul smelling discharge from your vagina  You cannot urinate, or you are urinating less than what is normal for you  You feel confused  Contact your healthcare provider if:   You have a fever  You do not feel like you are able to empty your bladder completely when you urinate  You feel the need to urinate very suddenly  You have burning or stinging when you urinate  You have blood in your urine  Your skin is itchy, swollen, or you have a rash  You have questions or concerns about your condition or care  Medicines:   Prescription pain medicine  may be given  Ask your how to take this medicine safely  Take your medicine as directed  Contact your healthcare provider if you think your medicine is not helping or if you have side effects  Tell him or her if you are allergic to any medicine  Keep a list of the medicines, vitamins, and herbs you take  Include the amounts, and when and why you take them  Bring the list or the pill bottles to follow-up visits  Carry your medicine list with you in case of an emergency  Self-catheterization:  You may need to put a catheter into your bladder after you urinate to empty any remaining urine  A catheter is a small rubber tube used to drain urine  Healthcare providers will teach you how to put the catheter in safely   This may be needed until you are completely emptying your bladder  Bridges catheter: You may have a Bridges catheter for a short period of time  The Bridges is a tube put into your bladder to drain urine into a bag  Keep the bag below your waist  This will prevent urine from flowing back into your bladder and causing an infection or other problems  Also, keep the tube free of kinks so the urine will drain properly  Do not pull on the catheter  This can cause pain and bleeding, and may cause the catheter to come out  Activity:  Do not lift heavy objects for 6 weeks after your procedure  Do not have intercourse for 4 to 6 weeks  Do not use a tampon for 4 weeks  Ask your healthcare provider when you can return to work or your usual activities  Do pelvic muscle exercises: These are also called Kegel exercises  These exercises help strengthen your pelvic muscles and help prevent urine leakage  Tighten the muscles of your pelvis and hold them tight for 5 seconds, then relax for 5 seconds  Gradually work up to tightening them for 10 seconds and relaxing for 10 seconds  Do this 3 times each day  Keep a record:  Keep a record of when you urinate and if you leak any urine  Write down what you were doing when you leaked urine, such as coughing or sneezing  Bring the log to your follow-up visits  Prevent constipation:  Drink liquids as directed  You may need to drink more water than usual to soften your bowel movements  Eat a variety of healthy foods, especially fruit and foods high in fiber  You may need to use an over-the-counter bowel movement softener  Follow up with your healthcare provider as directed: You may need a test to check how much urine remains in your bladder after you urinate  This will help show how the sling is working  Write down your questions so you remember to ask them during your visits  © 2017 [employer] Information is for End User's use only and may not be sold, redistributed or otherwise used for commercial purposes   All illustrations and images included in CareNotes® are the copyrighted property of A D A M , Inc  or Keegan Fung  The above information is an  only  It is not intended as medical advice for individual conditions or treatments  Talk to your doctor, nurse or pharmacist before following any medical regimen to see if it is safe and effective for you  Posterior Vaginal Repair   WHAT YOU NEED TO KNOW:   A posterior vaginal repair is surgery to fix a rectocele or vaginal hernia  DISCHARGE INSTRUCTIONS:   Medicines:   Pain medicine  will help take away or decrease pain  Do not wait until the pain is severe before you take your medicine  NSAIDs , such as ibuprofen, help decrease swelling, pain, and fever  This medicine is available with or without a doctor's order  NSAIDs can cause stomach bleeding or kidney problems in certain people  If you take blood thinner medicine, always ask your healthcare provider if NSAIDs are safe for you  Always read the medicine label and follow directions  Bowel movement softeners  make it easier for you to have a bowel movement  You may need this medicine to treat or prevent constipation  Take your medicine as directed  Contact your healthcare provider if you think your medicine is not helping or if you have side effects  Tell him or her if you are allergic to any medicine  Keep a list of the medicines, vitamins, and herbs you take  Include the amounts, and when and why you take them  Bring the list or the pill bottles to follow-up visits  Carry your medicine list with you in case of an emergency  Follow up with your gynecologist in 2 weeks: You will need to return to have your incision checked  Write down your questions so you remember to ask them during your visits  Self-care:   Do not have sex  until your healthcare provider says it is okay  Do not put anything in your vagina  for 6 weeks after the surgery   This allows time for the wound to heal      Do not lift more than 10 pounds  for at least 6 weeks  Heavy lifting puts pressure on the surgery area and slows healing  Avoid heavy exercise  the first few weeks after the surgery  You may try light activity, such as short walks, 3 to 4 weeks after the surgery  Try not to cough or strain to have a bowel movement  This may cause damage to the surgery area  Ask your healthcare provider about ways to make bowel movements easier so you do not have to strain  Eat healthy foods and drink liquids as directed  This will help prevent constipation  Healthy foods include fruits, vegetables, whole-grain breads, low-fat dairy products, beans, lean meats, and fish  Contact your healthcare provider or gynecologist if:   You have vaginal pain that does not go away, even after you take pain medicine  You have pus or a foul-smelling discharge from your vagina  You have pain during sex  You have a fever or chills  Your wound is red, swollen, or draining pus  You have questions or concerns about your condition or care  Seek care immediately or call 911 if:   You soak a sanitary pad with blood every hour for 4 hours  You feel something is bulging out into your vagina or rectum and not going back in  You cannot urinate  © 2017 [address]  Information is for End User's use only and may not be sold, redistributed or otherwise used for commercial purposes  All illustrations and images included in CareNotes® are the copyrighted property of [employer]  or Keegan Fung  The above information is an  only  It is not intended as medical advice for individual conditions or treatments  Talk to your doctor, nurse or pharmacist before following any medical regimen to see if it is safe and effective for you

## 2023-02-06 NOTE — ANESTHESIA PREPROCEDURE EVALUATION
Procedure:  RECTOCELE REPAIR; POSTERIOR COLPORRHAPHY (Perineum)  PB  SLING (Vagina )  CYSTO (Bladder)    Relevant Problems   ANESTHESIA   (+) PONV (postoperative nausea and vomiting)      NEURO/PSYCH   (+) Situational anxiety      Other   (+) Female stress incontinence        Physical Exam    Airway    Mallampati score: II  TM Distance: >3 FB  Neck ROM: full     Dental   No notable dental hx     Cardiovascular  Rhythm: regular, Rate: normal, Cardiovascular exam normal    Pulmonary  Pulmonary exam normal Breath sounds clear to auscultation,     Other Findings        Anesthesia Plan  ASA Score- 2     Anesthesia Type- IV sedation with anesthesia with ASA Monitors  Additional Monitors:   Airway Plan:     Comment: GA prn  Plan Factors-    Chart reviewed  Existing labs reviewed  Patient summary reviewed  Patient is not a current smoker  Patient not instructed to abstain from smoking on day of procedure  Patient did not smoke on day of surgery  Induction- intravenous  Postoperative Plan-     Informed Consent- Anesthetic plan and risks discussed with patient and mother Beth Cadet

## 2023-02-06 NOTE — OP NOTE
OPERATIVE REPORT  PATIENT NAME: Sari Pena    :  1975  MRN: [id number]  Pt Location: [facility]    SURGERY DATE: 2023    Surgeon(s) and Role: * Krista Ness MD - Primary     * Arron Katz MD - Serafin Saul MD - Assisting    Preop Diagnosis:  Rectocele [N81 6]  Hypermobility of urethra [N36 41]  Stress incontinence (female) (male) [N39 3]    Post-Op Diagnosis Codes:     * Rectocele [N81 6]     * Hypermobility of urethra [N36 41]     * Stress incontinence (female) (male) [N39 3]    Procedure(s):  POSTERIOR COLPORRHAPHY  PB  SLING  CYSTO    Estimated Blood Loss: 10 mL    Anesthesia Type: IV Sedation with Anesthesia    Operative Indications:  Rectocele [N81 6]  Hypermobility of urethra [N36 41]  Stress incontinence (female) (male) [N39 3]    Operative Findings:  -     Complications:   None    Procedure and Technique:  The patient was properly identified in the holding area by the operating room staff and attending physician  She was taken to the operating room where general anesthesia was instituted without complication  She was placed in the dorsal lithotomy position with her legs in [address] [place] [address] with care taken to avoid excessive flexion or extension of her lower extremities  Time-out was performed  The patient was again properly identified by the operating room staff and operating physician  At this time, the patient was prepped and draped in normal sterile fashion  We inserted the Bridges catheter under sterile conditions for intermittent bladder drainage  We turned our attention for performance of the single incision sling  At this time, the mid urethral zone was identified in reference to the Bridges catheter and bilateral obturator blocks were performed  The urethral meatus and local anesthetic solution was injected into the anterior vaginal wall at the mid urethral level for hydrodissection and vasoconstriction   Next, additional local was injected at the midline and to the left and right of midline directing the infiltration laterally towards the cephalad aspect of the inferior pubic ramus bilaterally  Care was taken to ensure that the sulcus was flattened and free of infiltration to minimize chance for buttonholing or tapping too close to the anterolateral sulcus  After completion of hydrodissection, 2 Allis clamps were used to grasp the anterior vaginal wall for traction  A 1 5 cm incision was made to the midline  Two Allis clamps were then placed on each cut edge of the incision for stabilization  Tenotomy scissors were used to create a small vaginal tunnel with sharp and blunt dissection above the anterior vaginal wall directed laterally towards the cephalad aspect of the inferior pubic ramus bilaterally  Dissection was carried out to the edge of the bone itself but no dissection into the obturator internus muscle  Once the dissection was complete, the sling was placed  The Altis system was used  An index finger was placed in the vagina for guidance  The Altis trocar was placed into the pre-dissected tract  The handle was held in horizontal slight upward canting to avoid buttonholing of the sulcus  Cephalad drift was used to allow passage around the inferior pubic ramus  A thumb was placed on the heel of the introducer to allow a push/pivot maneuver to place a fixed anchor into the obturator internus muscle membrane complex on the patient's left side  Proper handle deviation confirmed proper anchor placement, as well as a gentle tugging on the sling  Once the introducer was removed, it also confirmed proper anchor placement  The exact same sequence of steps was repeated on the patient's right side with adjustable anchor  Once it was complete, the introducer was removed and gentle tugging again confirmed proper anchor placement       The Bridges catheter was then used to drain the bladder and then it was removed and a diagnostic cystoscopy was performed by instilling 300 mL of fluid into the bladder  Both ureters were effluxing normally and there were no lacerations or injuries noted in the lower urinary tract  We used cough maneuver to elicit loss of fluid from the urethral meatus and there was loss of fluid noted  The tensioning suture was used to adjust the tape until there was minimal to no leakage with Crede  After appropriate tensioning, the tensioning suture was cut and the vaginal incision was closed with 2-0 Vicryl suture in a running locked stitch  At this time, we placed the Bridges back in to drain the bladder and then it was removed  We completed the procedure  There were no complications  Attention was then turned to the posterior compartment where two Allis clamps were placed in the posterior fourchette over the mucocutaneous border to reduce the markedly relaxed vaginal outlet  Dilute 0 25% Marcaine solution with epinephrine was injected into the perineum  A bertin-shaped incision was made extending from the vaginal mucosa onto the perineum  The overlying scarred vaginal epithelium and perineal skin was removed en bloc with the Bovie device with excellent hemostasis noted throughout  With a concomitant digital rectal examination to deviate the rectum away from the dissection planes, the rectovaginal space was entered sharply and the incision was extended to the level of the cuff  The vaginal full-thickness incision was extended cephalad with Bovie vaporization  The rectal muscularis layer was plicated with a 2-0 Vicryl in a side-side fashion  The posterior colporrhaphy was closed with a 2-0 Vicryl suture in a running locked stitch  We reapproximated the perineal body with a 0-Vicryl interrupted suture  The remainder of the colporrhaphy was closed to the level of the hymen  The perineal skin was closed with with 2-0 Vicryl in a subcuticular fashion  The sponge, needle and instrument count were correct x2   The patient tolerated the procedure well and went to the recovery room in stable condition and she is stable at the moment of this dictation  Dr Eric Mayer was present for the entire procedure          Patient Disposition:  PACU         SIGNATURE: Trena Parrish MD  DATE: February 6, 2023  TIME: 3:46 PM

## 2023-10-26 ENCOUNTER — RA CDI HCC (OUTPATIENT)
Dept: OTHER | Facility: HOSPITAL | Age: 48
End: 2023-10-26

## 2023-10-26 NOTE — PROGRESS NOTES
[address] [facility] coding opportunities       Chart reviewed, no opportunity found: CHART REVIEWED, NO OPPORTUNITY FOUND        Patients Insurance        Commercial Insurance: [address]

## 2023-11-01 ENCOUNTER — OFFICE VISIT (OUTPATIENT)
Dept: FAMILY MEDICINE CLINIC | Facility: CLINIC | Age: 48
End: 2023-11-01
Payer: COMMERCIAL

## 2023-11-01 VITALS
DIASTOLIC BLOOD PRESSURE: 80 MMHG | OXYGEN SATURATION: 99 % | BODY MASS INDEX: 30.49 KG/M2 | TEMPERATURE: 97.8 F | RESPIRATION RATE: 16 BRPM | SYSTOLIC BLOOD PRESSURE: 140 MMHG | HEART RATE: 77 BPM | HEIGHT: 65 IN | WEIGHT: 183 LBS

## 2023-11-01 DIAGNOSIS — E55.9 VITAMIN D DEFICIENCY: ICD-10-CM

## 2023-11-01 DIAGNOSIS — N92.6 MENSTRUAL DISORDER: ICD-10-CM

## 2023-11-01 DIAGNOSIS — Z00.00 ANNUAL PHYSICAL EXAM: Primary | Chronic | ICD-10-CM

## 2023-11-01 DIAGNOSIS — Z83.49 FAMILY HISTORY OF THYROID DISORDER: ICD-10-CM

## 2023-11-01 DIAGNOSIS — Z83.3 FAMILY HISTORY OF DIABETES MELLITUS (DM): ICD-10-CM

## 2023-11-01 DIAGNOSIS — E53.8 VITAMIN B12 DEFICIENCY: ICD-10-CM

## 2023-11-01 DIAGNOSIS — Z83.438 FAMILY HISTORY OF HYPERLIPIDEMIA: ICD-10-CM

## 2023-11-01 DIAGNOSIS — Z12.31 ENCOUNTER FOR SCREENING MAMMOGRAM FOR BREAST CANCER: ICD-10-CM

## 2023-11-01 DIAGNOSIS — Z23 ENCOUNTER FOR IMMUNIZATION: ICD-10-CM

## 2023-11-01 PROCEDURE — 90686 IIV4 VACC NO PRSV 0.5 ML IM: CPT

## 2023-11-01 PROCEDURE — 90471 IMMUNIZATION ADMIN: CPT

## 2023-11-01 PROCEDURE — 99396 PREV VISIT EST AGE 40-64: CPT | Performed by: FAMILY MEDICINE

## 2023-11-01 NOTE — PROGRESS NOTES
[address] [facility] PRIMARY CARE    NAME: Marquita Benz  AGE: 50 y.o. SEX: female  : 1975     DATE: 11/3/2023     Assessment and Plan:   Marquita Ross was seen today for well check. Diagnoses and all orders for this visit:    Annual physical exam    Encounter for screening mammogram for breast cancer    Encounter for immunization  -     influenza vaccine, quadrivalent, 0.5 mL, preservative-free, for adult and pediatric patients 6 mos+ (AFLURIA, FLUARIX, FLULAVAL, FLUZONE)    Family history of diabetes mellitus (DM)  -     Hemoglobin A1C; Future  -     Comprehensive metabolic panel; Future    Vitamin B12 deficiency  -     Vitamin B12; Future    Vitamin D deficiency  -     Vitamin D 25 hydroxy; Future    Family history of thyroid disorder  -     T3, free; Future  -     T4, free; Future  -     TSH, 3rd generation; Future  -     Comprehensive metabolic panel; Future    Family history of hyperlipidemia  -     Lipid Panel with Direct LDL reflex; Future    Menstrual disorder  -     Iron Panel (Includes Ferritin, Iron Sat%, Iron, and TIBC);  Future       Problem List Items Addressed This Visit          Other    Vitamin B12 deficiency    Relevant Orders    Vitamin B12    Vitamin D deficiency    Relevant Orders    Vitamin D 25 hydroxy     Other Visit Diagnoses       Annual physical exam  (Chronic)   -  Primary    Encounter for screening mammogram for breast cancer        Encounter for immunization        Relevant Orders    influenza vaccine, quadrivalent, 0.5 mL, preservative-free, for adult and pediatric patients 6 mos+ (AFLURIA, FLUARIX, FLULAVAL, FLUZONE) (Completed)    Family history of diabetes mellitus (DM)        Relevant Orders    Hemoglobin A1C    Comprehensive metabolic panel    Family history of thyroid disorder        Relevant Orders    T3, free    T4, free    TSH, 3rd generation    Comprehensive metabolic panel    Family history of hyperlipidemia Relevant Orders    Lipid Panel with Direct LDL reflex    Menstrual disorder        Relevant Orders    Iron Panel (Includes Ferritin, Iron Sat%, Iron, and TIBC)            Immunizations and preventive care screenings were discussed with patient today. Appropriate education was printed on patient's after visit summary. Counseling:  Alcohol/drug use: discussed moderation in alcohol intake, the recommendations for healthy alcohol use,   Dental Health: discussed importance of regular tooth brushing, flossing, and dental visits. Injury prevention: discussed safety/seat belts, safety helmets, smoke detectors, carbon dioxide detectors,  Sexual health: discussed sexually transmitted diseases, partner selection, use of condoms, avoidance of unintended pregnancy, and contraceptive alternatives. Exercise: the importance of regular exercise/physical activity was discussed. Recommend exercise 3-5 times per week for at least 30 minutes. BMI Counseling: Body mass index is 30.12 kg/m². The BMI is above normal. Nutrition recommendations include decreasing portion sizes, encouraging healthy choices of fruits and vegetables, decreasing fast food intake, consuming healthier snacks, limiting drinks that contain sugar, moderation in carbohydrate intake, increasing intake of lean protein, reducing intake of saturated and trans fat and reducing intake of cholesterol. Exercise recommendations include exercising 3-5 times per week. Patient referred to PCP. Rationale for BMI follow-up plan is due to patient being overweight or obese. Trying to get motivation    Depression Screening and Follow-up Plan: Patient was screened for depression during today's encounter. They screened negative with a PHQ-2 score of 0.         Return in about 1 year (around 11/1/2024) for Annual physical.     Chief Complaint:     Chief Complaint   Patient presents with    Well Check      History of Present Illness:     Adult Annual Physical   Patient here for a comprehensive physical exam. The patient reports no problems. Procedure(s):  POSTERIOR COLPORRHAPHY  PB  SLING  CYSTO  Had successful urogynecological surgery. Urogynecologist suggested that patient should keep up conversations with PCP with regards to initiating (patient is not sure if he meant HRT versus topical estrogen (in the postmenopausal state. .  We discussed that HRT oral is generally more for vasomotor issues. Trying to get motivation to get back into exercise and weight management. Diet and Physical Activity  Diet/Nutrition: well balanced diet. Exercise: 3-4 times a week on average and trying . Depression Screening  PHQ-2/9 Depression Screening    Little interest or pleasure in doing things: 0 - not at all  Feeling down, depressed, or hopeless: 0 - not at all  PHQ-2 Score: 0  PHQ-2 Interpretation: Negative depression screen       General Health  Sleep:  variable . Hearing: no issues  Vision: most recent eye exam <1 year ago and wears glasses. Dental: regular dental visits. /GYN Health  Patient is: premenopausal  Last menstrual period: regular  Contraceptive method: IUD placement. OB History          3    Para   3    Term                AB        Living             SAB        IAB        Ectopic        Multiple        Live Births               Obstetric Comments   All girls 9,7,4 as of 2019                Advanced Care Planning  Do you have an advanced directive? no  Do you have a durable medical power of ?  no     Review of Systems:     Review of Systems   Past Medical History:     Past Medical History:   Diagnosis Date    Automatic atrial tachycardia     Hemorrhoids     Irregular heart beat     resolved    Mitral valve prolapse     resolved    Palpitations     last assessed 10/12/2012    PONV (postoperative nausea and vomiting)     Rectal prolapse     rectocele today 2023    Wears glasses       Past Surgical History:     Past Surgical History: Procedure Laterality Date    ABDOMINOPLASTY      ATRIAL ABLATION SURGERY      atrial tachycardia    CARDIAC CATHETERIZATION      1993 and 1995    COLONOSCOPY      HEMORROIDECTOMY  2010    HERNIA REPAIR      WY CYSTOURETHROSCOPY N/A 2/6/2023    Procedure: CYSTO;  Surgeon: Dorothy Marie MD;  Location: [facility];  Service: UroGynecology           WY REPAIR RECTOCELE SEPARATE PROCEDURE N/A 2/6/2023    Procedure: POSTERIOR COLPORRHAPHY;  Surgeon: Dorothy Marie MD;  [facility];  Service: UroGynecology           [place] SLING OPERATION STRESS INCONTINENCE N/A 2/6/2023    Procedure: PB  SLING;  Surgeon: Dorothy Marie MD;  Location: [facility];  Service: UroGynecology             Social History:     Social History     Socioeconomic History    Marital status:      Spouse name: None    Number of children: None    Years of education: None    Highest education level: None   Occupational History    None   Tobacco Use    Smoking status: Never    Smokeless tobacco: Never   Vaping Use    Vaping Use: Never used   Substance and Sexual Activity    Alcohol use: Yes     Comment: 1 x  monthly  [id number]    Drug use: Never    Sexual activity: Not Currently   Other Topics Concern    None   Social History Narrative    Trying to divorce     Social Determinants of Health     Financial Resource Strain: Not on file   Food Insecurity: Not on file   Transportation Needs: Not on file   Physical Activity: Not on file   Stress: Not on file   Social Connections: Not on file   Intimate Partner Violence: Not on file   Housing Stability: Not on file      Family History:     Family History   Problem Relation Age of Onset    Asperger's syndrome Mother     Colon polyps Mother     Diabetes Mother     Hyperlipidemia Mother     Hypertension Mother     Hypothyroidism Mother     Asperger's syndrome Father     Diabetes Father     Hyperlipidemia Father     Asperger's syndrome Other     Diabetes Family     Hypertension Family Asperger's syndrome Brother       Current Medications:     Current Outpatient Medications   Medication Sig Dispense Refill    acetaminophen (TYLENOL) 650 mg CR tablet Take 1 tablet (650 mg total) by mouth every 8 (eight) hours as needed for mild pain 30 tablet 0    Calcium Citrate 1040 MG TABS Take 1 tablet by mouth daily      Cholecalciferol (VITAMIN D3 PO) Vitamin D3      Collagen-Boron-Hyaluronic Acid (MOVE FREE ULTRA JOINT HEALTH PO) Take by mouth      Flaxseed, Linseed, (FLAXSEED OIL PO) Flaxseed Oil      hydrocortisone-pramoxine (PROCTOFOAM-HC) rectal foam Insert 1 applicator into the rectum 2 (two) times a day (Patient taking differently: Insert 1 applicator into the rectum as needed) 10 g 1    ibuprofen (MOTRIN) 600 mg tablet Take 1 tablet (600 mg total) by mouth every 6 (six) hours as needed for moderate pain 30 tablet 0    Multiple Vitamin (MULTIVITAMIN PO) Multivitamin      PARAGARD INTRAUTERINE COPPER IU Paragard Intrauterine Copper      Specialty Vitamins Products (ADVANCED COLLAGEN PO) Take by mouth      TART CHERRY PO Tart Cherry      TURMERIC CURCUMIN PO Take by mouth      docusate sodium (COLACE) 100 mg capsule Take 1 capsule (100 mg total) by mouth 2 (two) times a day (Patient not taking: Reported on 11/1/2023)  0    Multiple Vitamins-Minerals (MULTIVITAL) tablet Take 1 tablet by mouth daily      NON FORMULARY every morning ADRENEVIVE - supplement (Patient not taking: Reported on 11/1/2023)       No current facility-administered medications for this visit. Allergies:     No Known Allergies   Physical Exam:     /80   Pulse 77   Temp 97.8 °F (36.6 °C) (Temporal)   Resp 16   Ht 5' 5.35" (1.66 m)   Wt 83 kg (183 lb)   SpO2 99%   BMI 30.12 kg/m²     Physical Exam  Vitals and nursing note reviewed. Constitutional:       General: She is not in acute distress. Appearance: Normal appearance. She is well-developed. HENT:      Head: Normocephalic.       Right Ear: Tympanic membrane normal.      Left Ear: Tympanic membrane normal.      Mouth/Throat:      Mouth: Mucous membranes are moist.   Eyes:      Conjunctiva/sclera: Conjunctivae normal.   Neck:      Vascular: No carotid bruit. Cardiovascular:      Rate and Rhythm: Normal rate and regular rhythm. Heart sounds: No murmur heard. Pulmonary:      Effort: Pulmonary effort is normal. No respiratory distress. Breath sounds: Normal breath sounds. Abdominal:      General: Bowel sounds are normal.      Palpations: Abdomen is soft. Tenderness: There is no abdominal tenderness. Musculoskeletal:         General: No swelling. Skin:     Findings: No rash. Neurological:      Mental Status: She is alert and oriented to person, place, and time. Psychiatric:         Mood and Affect: Mood normal.         Behavior: Behavior normal.         Thought Content:  Thought content normal.         Judgment: Judgment normal.          James Carpenter DO  [facility]

## 2023-11-01 NOTE — PATIENT INSTRUCTIONS
Wellness Visit for Adults   AMBULATORY CARE:   A wellness visit  is when you see your healthcare provider to get screened for health problems. Your healthcare provider will also give you advice on how to stay healthy. Write down your questions so you remember to ask them. Ask your healthcare provider how often you should have a wellness visit. What happens at a wellness visit:  Your healthcare provider will ask about your health, and your family history of health problems. This includes high blood pressure, heart disease, and cancer. He or she will ask if you have symptoms that concern you, if you smoke, and about your mood. You may also be asked about your intake of medicines, supplements, food, and alcohol. Any of the following may be done: Your weight  will be checked. Your height may also be checked so your body mass index (BMI) can be calculated. Your BMI shows if you are at a healthy weight. Your blood pressure  and heart rate will be checked. Your temperature may also be checked. Blood and urine tests  may be done. Blood tests may be done to check your cholesterol levels. Abnormal cholesterol levels increase your risk for heart disease and stroke. You may also need a blood or urine test to check for diabetes if you are at increased risk. Urine tests may be done to look for signs of an infection or kidney disease. A physical exam  includes checking your heartbeat and lungs with a stethoscope. Your healthcare provider may also check your skin to look for sun damage. Screening tests  may be recommended. A screening test is done to check for diseases that may not cause symptoms. The screening tests you may need depend on your age, gender, family history, and lifestyle habits. For example, colorectal screening may be recommended if you are 48years old or older. Screening tests you need if you are a woman:   A Pap smear  is used to screen for cervical cancer.  Pap smears are usually done every 3 to 5 years depending on your age. You may need them more often if you have had abnormal Pap smear test results in the past. Ask your healthcare provider how often you should have a Pap smear. A mammogram  is an x-ray of your breasts to screen for breast cancer. Experts recommend mammograms every 2 years starting at age 48 years. You may need a mammogram at age 52 years or younger if you have an increased risk for breast cancer. Talk to your healthcare provider about when you should start having mammograms and how often you need them. Vaccines you may need:   Get an influenza vaccine  every year. The influenza vaccine protects you from the flu. Several types of viruses cause the flu. The viruses change over time, so new vaccines are made each year. Get a tetanus-diphtheria (Td) booster vaccine  every 10 years. This vaccine protects you against tetanus and diphtheria. Tetanus is a severe infection that may cause painful muscle spasms and lockjaw. Diphtheria is a severe bacterial infection that causes a thick covering in the back of your mouth and throat. Get a human papillomavirus (HPV) vaccine  if you are female and aged 23 to 32 or male 23 to 24 and never received it. This vaccine protects you from HPV infection. HPV is the most common infection spread by sexual contact. HPV may also cause vaginal, penile, and anal cancers. Get a pneumococcal vaccine  if you are aged 72 years or older. The pneumococcal vaccine is an injection given to protect you from pneumococcal disease. Pneumococcal disease is an infection caused by pneumococcal bacteria. The infection may cause pneumonia, meningitis, or an ear infection. Get a shingles vaccine  if you are 60 or older, even if you have had shingles before. The shingles vaccine is an injection to protect you from the varicella-zoster virus. This is the same virus that causes chickenpox.  Shingles is a painful rash that develops in people who had chickenpox or have been exposed to the virus. How to eat healthy:  My Plate is a model for planning healthy meals. It shows the types and amounts of foods that should go on your plate. Fruits and vegetables make up about half of your plate, and grains and protein make up the other half. A serving of dairy is included on the side of your plate. The amount of calories and serving sizes you need depends on your age, gender, weight, and height. Examples of healthy foods are listed below:  Eat a variety of vegetables  such as dark green, red, and orange vegetables. You can also include canned vegetables low in sodium (salt) and frozen vegetables without added butter or sauces. Eat a variety of fresh fruits , canned fruit in 100% juice, frozen fruit, and dried fruit. Include whole grains. At least half of the grains you eat should be whole grains. Examples include whole-wheat bread, wheat pasta, brown rice, and whole-grain cereals such as oatmeal.    Eat a variety of protein foods such as seafood (fish and shellfish), lean meat, and poultry without skin (turkey and chicken). Examples of lean meats include pork leg, shoulder, or tenderloin, and beef round, sirloin, tenderloin, and extra lean ground beef. Other protein foods include eggs and egg substitutes, beans, peas, soy products, nuts, and seeds. Choose low-fat dairy products such as skim or 1% milk or low-fat yogurt, cheese, and cottage cheese. Limit unhealthy fats  such as butter, hard margarine, and shortening. Exercise:  Exercise at least 30 minutes per day on most days of the week. Some examples of exercise include walking, biking, dancing, and swimming. You can also fit in more physical activity by taking the stairs instead of the elevator or parking farther away from stores. Include muscle strengthening activities 2 days each week. Regular exercise provides many health benefits.  It helps you manage your weight, and decreases your risk for type 2 diabetes, heart disease, stroke, and high blood pressure. Exercise can also help improve your mood. Ask your healthcare provider about the best exercise plan for you. General health and safety guidelines:   Do not smoke. Nicotine and other chemicals in cigarettes and cigars can cause lung damage. Ask your healthcare provider for information if you currently smoke and need help to quit. E-cigarettes or smokeless tobacco still contain nicotine. Talk to your healthcare provider before you use these products. Limit alcohol. A drink of alcohol is 12 ounces of beer, 5 ounces of wine, or 1½ ounces of liquor. Lose weight, if needed. Being overweight increases your risk of certain health conditions. These include heart disease, high blood pressure, type 2 diabetes, and certain types of cancer. Protect your skin. Do not sunbathe or use tanning beds. Use sunscreen with a SPF 15 or higher. Apply sunscreen at least 15 minutes before you go outside. Reapply sunscreen every 2 hours. Wear protective clothing, hats, and sunglasses when you are outside. Drive safely. Always wear your seatbelt. Make sure everyone in your car wears a seatbelt. A seatbelt can save your life if you are in an accident. Do not use your cell phone when you are driving. This could distract you and cause an accident. Pull over if you need to make a call or send a text message. Practice safe sex. Use latex condoms if are sexually active and have more than one partner. Your healthcare provider may recommend screening tests for sexually transmitted infections (STIs). Wear helmets, lifejackets, and protective gear. Always wear a helmet when you ride a bike or motorcycle, go skiing, or play sports that could cause a head injury. Wear protective equipment when you play sports. Wear a lifejacket when you are on a boat or doing water sports.     © Copyright Alistair [employer] 2023 Information is for End User's use only and may not be sold, redistributed or otherwise used for commercial purposes. The above information is an  only. It is not intended as medical advice for individual conditions or treatments. Talk to your doctor, nurse or pharmacist before following any medical regimen to see if it is safe and effective for you.

## 2023-11-02 ENCOUNTER — TELEPHONE (OUTPATIENT)
Dept: ADMINISTRATIVE | Facility: OTHER | Age: 48
End: 2023-11-02

## 2023-11-02 NOTE — LETTER
Procedure Request Form: Mammogram      Date Requested: 23  Patient: Dallas Malagon  Patient : 1975   Referring Provider: Damaris Schwab, DO        Date of Procedure ______________________________       The above patient has informed us that they have completed their   most recent Mammogram at your facility. Please complete   this form and attach all corresponding procedure reports/results. Comments __________________________________________________________  ____________________________________________________________________  ____________________________________________________________________  ____________________________________________________________________    Facility Completing Procedure _________________________________________    Form Completed By (print name) _______________________________________      Signature __________________________________________________________      These reports are needed for  compliance. Please fax this completed form and a copy of the procedure report to our office located at [address] [place] [address] as soon as possible to Fax [phone] anne Bone: Phone [phone]    We thank you for your assistance in treating our mutual patient.

## 2023-11-02 NOTE — TELEPHONE ENCOUNTER
Upon review of the In Basket request and the patient's chart, initial outreach has been made via fax to facility. Please see Contacts section for details.      Thank you  Neris Escamilla MA

## 2023-11-02 NOTE — LETTER
Procedure Request Form: Mammogram      Date Requested: 23  Patient: Lea Hairston  Patient : 1975   Referring Provider: Uche Galo, DO        Date of Procedure ______________________________       The above patient has informed us that they have completed their   most recent Mammogram at your facility. Please complete   this form and attach all corresponding procedure reports/results. Comments __________________________________________________________  ____________________________________________________________________  ____________________________________________________________________  ____________________________________________________________________    Facility Completing Procedure _________________________________________    Form Completed By (print name) _______________________________________      Signature __________________________________________________________      These reports are needed for  compliance. Please fax this completed form and a copy of the procedure report to our office located at [address] [place] [address] as soon as possible to Fax [phone] attention Byron Sage: Phone [phone]    We thank you for your assistance in treating our mutual patient.

## 2023-11-02 NOTE — TELEPHONE ENCOUNTER
----- Message from Selvin Cazares DO sent at 99/1/4891  4:04 PM EDT -----  Regarding: care gap request  11/01/23 4:04 PM    Hello, our patient attached above has had Mammogram completed/performed. Please assist in updating the patient chart by making an External outreach to [facility] located [facility]. The date of service is 8/21/2023.     Thank you,  Selvin Cazares [facility]

## 2023-11-13 NOTE — TELEPHONE ENCOUNTER
As a follow-up, a second attempt has been made for outreach via fax to facility. Please see Contacts section for details.     Thank you  Nidhi Calhoun MA

## 2023-11-14 NOTE — TELEPHONE ENCOUNTER
Upon review of the In Basket request we were able to locate, review, and update the patient chart as requested for Mammogram.    Any additional questions or concerns should be emailed to the Practice Liaisons via the appropriate education email address, please do not reply via In Basket.     Thank you  Mario Jarrell MA

## 2023-11-15 NOTE — TELEPHONE ENCOUNTER
Called patient-she did get the message 
I sent the patient the information through "my chart "make sure that she got my message
Yes I can order uric acid level  And the treatment if it is gout is a prednisone taper course  But the uric acid level has to be drawn prior to starting steroids 
No

## 2023-11-17 LAB
25(OH)D3+25(OH)D2 SERPL-MCNC: 46 NG/ML (ref 30–100)
ALBUMIN SERPL-MCNC: 4.6 G/DL (ref 3.9–4.9)
ALBUMIN/GLOB SERPL: 2.3 {RATIO} (ref 1.2–2.2)
ALP SERPL-CCNC: 55 IU/L (ref 44–121)
ALT SERPL-CCNC: 21 IU/L (ref 0–32)
AST SERPL-CCNC: 21 IU/L (ref 0–40)
BILIRUB SERPL-MCNC: 0.5 MG/DL (ref 0–1.2)
BUN SERPL-MCNC: 15 MG/DL (ref 6–24)
BUN/CREAT SERPL: 16 (ref 9–23)
CALCIUM SERPL-MCNC: 8.9 MG/DL (ref 8.7–10.2)
CHLORIDE SERPL-SCNC: 104 MMOL/L (ref 96–106)
CHOLEST SERPL-MCNC: 180 MG/DL (ref 100–199)
CHOLEST/HDLC SERPL: 3.6 RATIO (ref 0–4.4)
CO2 SERPL-SCNC: 25 MMOL/L (ref 20–29)
CREAT SERPL-MCNC: 0.93 MG/DL (ref 0.57–1)
EGFR: 76 ML/MIN/1.73
GLOBULIN SER-MCNC: 2 G/DL (ref 1.5–4.5)
GLUCOSE SERPL-MCNC: 90 MG/DL (ref 70–99)
HBA1C MFR BLD: 5.3 % (ref 4.8–5.6)
HDLC SERPL-MCNC: 50 MG/DL
IRON SATN MFR SERPL: 22 % (ref 15–55)
IRON SERPL-MCNC: 72 UG/DL (ref 27–159)
LDLC SERPL CALC-MCNC: 111 MG/DL (ref 0–99)
POTASSIUM SERPL-SCNC: 4.4 MMOL/L (ref 3.5–5.2)
PROT SERPL-MCNC: 6.6 G/DL (ref 6–8.5)
SL AMB VLDL CHOLESTEROL CALC: 19 MG/DL (ref 5–40)
SODIUM SERPL-SCNC: 141 MMOL/L (ref 134–144)
T3FREE SERPL-MCNC: 2.7 PG/ML (ref 2–4.4)
T4 FREE SERPL-MCNC: 1.04 NG/DL (ref 0.82–1.77)
TIBC SERPL-MCNC: 333 UG/DL (ref 250–450)
TRIGL SERPL-MCNC: 107 MG/DL (ref 0–149)
TSH SERPL DL<=0.005 MIU/L-ACNC: 2.03 UIU/ML (ref 0.45–4.5)
UIBC SERPL-MCNC: 261 UG/DL (ref 131–425)
VIT B12 SERPL-MCNC: 618 PG/ML (ref 232–1245)

## 2024-02-23 ENCOUNTER — TELEPHONE (OUTPATIENT)
Age: 49
End: 2024-02-23

## 2024-02-23 DIAGNOSIS — K64.9 HEMORRHOIDS, UNSPECIFIED HEMORRHOID TYPE: ICD-10-CM

## 2024-02-23 DIAGNOSIS — K64.9 HEMORRHOIDS, UNSPECIFIED HEMORRHOID TYPE: Primary | ICD-10-CM

## 2024-02-23 NOTE — TELEPHONE ENCOUNTER
Patient called the RX Refill Line. Message is being forwarded to the office.     Patient is requesting hydrocortisone-pramoxine (PROCTOFOAM-HC) rectal foam: Insert 1 applicator into the rectum 2 (two) times a day. Unfortunately when I try to reorder script it states prescription is no longer available. Please send in an alternative for patient. She states she is needing it today.     Please contact patient at [phone]

## 2024-10-25 ENCOUNTER — RA CDI HCC (OUTPATIENT)
Dept: OTHER | Facility: HOSPITAL | Age: 49
End: 2024-10-25

## 2024-10-25 NOTE — PROGRESS NOTES
HCC coding opportunities       Chart reviewed, no opportunity found: CHART REVIEWED, NO OPPORTUNITY FOUND        Patients Insurance        Commercial Insurance: [employer]

## 2024-11-01 ENCOUNTER — OFFICE VISIT (OUTPATIENT)
Dept: FAMILY MEDICINE CLINIC | Facility: CLINIC | Age: 49
End: 2024-11-01
Payer: COMMERCIAL

## 2024-11-01 VITALS
OXYGEN SATURATION: 98 % | TEMPERATURE: 97.6 F | WEIGHT: 186.4 LBS | SYSTOLIC BLOOD PRESSURE: 140 MMHG | DIASTOLIC BLOOD PRESSURE: 90 MMHG | HEART RATE: 76 BPM | HEIGHT: 65 IN | BODY MASS INDEX: 31.06 KG/M2

## 2024-11-01 DIAGNOSIS — Z00.00 ANNUAL PHYSICAL EXAM: Primary | ICD-10-CM

## 2024-11-01 DIAGNOSIS — E61.1 LOW SERUM IRON: ICD-10-CM

## 2024-11-01 DIAGNOSIS — R53.81 PHYSICAL DECONDITIONING: ICD-10-CM

## 2024-11-01 DIAGNOSIS — Z83.49 FAMILY HISTORY OF THYROID DISORDER: ICD-10-CM

## 2024-11-01 DIAGNOSIS — Z83.438 FAMILY HISTORY OF HYPERLIPIDEMIA: ICD-10-CM

## 2024-11-01 DIAGNOSIS — E53.8 VITAMIN B12 DEFICIENCY: ICD-10-CM

## 2024-11-01 DIAGNOSIS — E55.9 VITAMIN D DEFICIENCY: ICD-10-CM

## 2024-11-01 DIAGNOSIS — Z13.6 ENCOUNTER FOR SCREENING FOR VASCULAR DISEASE: ICD-10-CM

## 2024-11-01 DIAGNOSIS — Z13.6 SCREENING FOR HEART DISEASE: ICD-10-CM

## 2024-11-01 PROCEDURE — 99396 PREV VISIT EST AGE 40-64: CPT | Performed by: FAMILY MEDICINE

## 2024-11-01 RX ORDER — LIFITEGRAST 50 MG/ML
SOLUTION/ DROPS OPHTHALMIC
COMMUNITY
Start: 2024-10-10

## 2024-11-01 NOTE — PROGRESS NOTES
Adult Annual Physical  Name: Rupa Benz      : 1975      MRN: [id number]  Encounter Provider: Barbi Hudson DO  Encounter Date: 2024   Encounter department: [facility]    Assessment & Plan  Annual physical exam         Screening for heart disease    Orders:    CT coronary calcium score; Future    Encounter for screening for vascular disease    Orders:    VAS screening; Future    Vitamin B12 deficiency    Orders:    Vitamin B12; Future    Vitamin B12    Vitamin D deficiency    Orders:    Vitamin D 25 hydroxy; Future    Vitamin D 25 hydroxy    Family history of thyroid disorder    Orders:    T3, free; Future    T4, free; Future    TSH, 3rd generation; Future    T3, reverse; Future    T3, free    T4, free    TSH, 3rd generation    T3, reverse    Family history of hyperlipidemia    Orders:    Lipid Panel with Direct LDL reflex; Future    Hemoglobin A1C; Future    Comprehensive metabolic panel; Future    Lipid Panel with Direct LDL reflex    Hemoglobin A1C    Comprehensive metabolic panel    Low serum iron    Orders:    Iron Panel (Includes Ferritin, Iron Sat%, Iron, and TIBC); Future    Physical deconditioning    Orders:    Ambulatory Referral to Medical Fitness Exercise Specialist; Future    Immunizations and preventive care screenings were discussed with patient today. Appropriate education was printed on patient's after visit summary.    Counseling:  Alcohol/drug use: discussed moderation in alcohol intake, the recommendations for healthy alcohol.  Dental Health: discussed importance of regular tooth brushing, flossing, and dental visits.  Injury prevention: discussed safety/seat belts, safety helmets, smoke detectors, carbon monoxide detectors, and smoking near bedding or upholstery.  Sexual health: discussed sexually transmitted diseases, partner selection, use of condoms, avoidance of unintended pregnancy, and contraceptive alternatives.  Exercise: the importance of  "regular exercise/physical activity was discussed. Recommend exercise 3-5 times per week for at least 30 minutes.       Depression Screening and Follow-up Plan: Patient was screened for depression during today's encounter. They screened negative with a PHQ-2 score of 0.        History of Present Illness     Adult Annual Physical:  Patient presents for annual physical.     Diet and Physical Activity:  - Diet/Nutrition: poor diet.  - Exercise: no formal exercise. As access to [facility] just needs to do it    Depression Screening:  - PHQ-2 Score: 0    General Health:  - Sleep:. Variable  - Hearing: normal hearing bilateral ears.  - Vision: wears glasses and most recent eye exam < 1 year ago.  - Dental: regular dental visits.    /GYN Health:  - Follows with GYN: yes.   - Menopause: perimenopausal.   Annual: [facility] August 27, 2024        49 year old female presents with c/o Annual Exam.         Annual exam            Menstrual cycles regular q 23-25 days x 5 days with occasional brown spotting after           Intermenstrual bleeding no           Gynecologic symptoms  brain fog, fatigue           Sexually active no           Contraception non-hormonal IUD   Review of Systems   Constitutional:         Weight gain, low motivation   Gastrointestinal:         Hemorrhoid is symptomatic   Musculoskeletal:  Positive for arthralgias (But I am not moving like I should) and myalgias.         Objective     /90   Pulse 76   Temp 97.6 °F (36.4 °C) (Temporal)   Ht 5' 4.57\" (1.64 m)   Wt 84.6 kg (186 lb 6.4 oz)   SpO2 98%   BMI 31.44 kg/m²     Physical Exam  Vitals reviewed.   Constitutional:       Appearance: Normal appearance.   HENT:      Right Ear: Tympanic membrane normal.      Left Ear: Tympanic membrane normal.      Mouth/Throat:      Mouth: Mucous membranes are moist.   Eyes:      Pupils: Pupils are equal, round, and reactive to light.   Cardiovascular:      Rate and Rhythm: Normal rate and " regular rhythm.   Pulmonary:      Effort: Pulmonary effort is normal.      Breath sounds: Normal breath sounds.   Abdominal:      General: Bowel sounds are normal.      Palpations: Abdomen is soft.   Musculoskeletal:      Right lower leg: No edema.      Left lower leg: No edema.   Skin:     Findings: No rash.   Neurological:      Mental Status: She is alert and oriented to person, place, and time.   Psychiatric:         Mood and Affect: Mood normal.         Behavior: Behavior normal.         Thought Content: Thought content normal.         Judgment: Judgment normal.

## 2024-11-01 NOTE — PATIENT INSTRUCTIONS
"Patient Education     Routine physical for adults   The Basics   Written by the doctors and editors at [facility]   What is a physical? -- A physical is a routine visit, or \"check-up,\" with your doctor. You might also hear it called a \"wellness visit\" or \"preventive visit.\"  During each visit, the doctor will:   Ask about your physical and mental health   Ask about your habits, behaviors, and lifestyle   Do an exam   Give you vaccines if needed   Talk to you about any medicines you take   Give advice about your health   Answer your questions  Getting regular check-ups is an important part of taking care of your health. It can help your doctor find and treat any problems you have. But it's also important for preventing health problems.  A routine physical is different from a \"sick visit.\" A sick visit is when you see a doctor because of a health concern or problem. Since physicals are scheduled ahead of time, you can think about what you want to ask the doctor.  How often should I get a physical? -- It depends on your age and health. In general, for people age 21 years and older:   If you are younger than 50 years, you might be able to get a physical every 3 years.   If you are 50 years or older, your doctor might recommend a physical every year.  If you have an ongoing health condition, like diabetes or high blood pressure, your doctor will probably want to see you more often.  What happens during a physical? -- In general, each visit will include:   Physical exam - The doctor or nurse will check your height, weight, heart rate, and blood pressure. They will also look at your eyes and ears. They will ask about how you are feeling and whether you have any symptoms that bother you.   Medicines - It's a good idea to bring a list of all the medicines you take to each doctor visit. Your doctor will talk to you about your medicines and answer any questions. Tell them if you are having any side effects that bother you. You " "should also tell them if you are having trouble paying for any of your medicines.   Habits and behaviors - This includes:   Your diet   Your exercise habits   Whether you smoke, drink alcohol, or use drugs   Whether you are sexually active   Whether you feel safe at home  Your doctor will talk to you about things you can do to improve your health and lower your risk of health problems. They will also offer help and support. For example, if you want to quit smoking, they can give you advice and might prescribe medicines. If you want to improve your diet or get more physical activity, they can help you with this, too.   Lab tests, if needed - The tests you get will depend on your age and situation. For example, your doctor might want to check your:   Cholesterol   Blood sugar   Iron level   Vaccines - The recommended vaccines will depend on your age, health, and what vaccines you already had. Vaccines are very important because they can prevent certain serious or deadly infections.   Discussion of screening - \"Screening\" means checking for diseases or other health problems before they cause symptoms. Your doctor can recommend screening based on your age, risk, and preferences. This might include tests to check for:   Cancer, such as breast, prostate, cervical, ovarian, colorectal, prostate, lung, or skin cancer   Sexually transmitted infections, such as chlamydia and gonorrhea   Mental health conditions like depression and anxiety  Your doctor will talk to you about the different types of screening tests. They can help you decide which screenings to have. They can also explain what the results might mean.   Answering questions - The physical is a good time to ask the doctor or nurse questions about your health. If needed, they can refer you to other doctors or specialists, too.  Adults older than 65 years often need other care, too. As you get older, your doctor will talk to you about:   How to prevent falling at " home   Hearing or vision tests   Memory testing   How to take your medicines safely   Making sure that you have the help and support you need at home  All topics are updated as new evidence becomes available and our peer review process is complete.  This topic retrieved from [employer] on: May 02, 2024.  Topic 219782 Version 1.0  Release: 32.4.3 - C32.122  © 2024 UpToDate, Inc[employer] and/or its affiliates. All rights reserved.  Consumer Information Use and Disclaimer   Disclaimer: This generalized information is a limited summary of diagnosis, treatment, and/or medication information. It is not meant to be comprehensive and should be used as a tool to help the user understand and/or assess potential diagnostic and treatment options. It does NOT include all information about conditions, treatments, medications, side effects, or risks that may apply to a specific patient. It is not intended to be medical advice or a substitute for the medical advice, diagnosis, or treatment of a health care provider based on the health care provider's examination and assessment of a patient's specific and unique circumstances. Patients must speak with a health care provider for complete information about their health, medical questions, and treatment options, including any risks or benefits regarding use of medications. This information does not endorse any treatments or medications as safe, effective, or approved for treating a specific patient. Up[employer]Date, Inc. and its affiliates disclaim any warranty or liability relating to this information or the use thereof.The use of this information is governed by the Terms of Use, available at https://www.woltersEverPoweruwer.com/en/know/clinical-effectiveness-terms. 2024© UpToDate, Inc. and its affiliates and/or licensors. All rights reserved.  Copyright   © 2024 Up[employer], Inc. and/or its affiliates. All rights reserved.

## 2024-11-26 ENCOUNTER — TELEPHONE (OUTPATIENT)
Age: 49
End: 2024-11-26

## 2024-11-26 NOTE — TELEPHONE ENCOUNTER
Pt called to schedule a sick appt.  Pt is coughing and has chest congestion.    First available appt was 12/3    If something opens up or pt can be seen sooner please contact. Unable to reach office.

## 2024-11-26 NOTE — TELEPHONE ENCOUNTER
Returned patients call, currently only taking Ibuprofen last does last night. Recommended hydration, and a decongestant that works for her best. Patient is scheduled for Tuesday 12/3, offered Monday, patient said Tuesday is ok. Recommended patient seek urgent care if worsens over next few days, patient in agreement.

## 2024-12-02 NOTE — TELEPHONE ENCOUNTER
Patient contacted the office this afternoon. States she did go to [facility] last week the 27th. Was prescribed a zpack and finished it last night. States her daughter went to  today and completed a chest xray diagnosed with pneumonia. She is now wanting to know if the zpack was sufficient enough or if there is something else she should be taking. She does have the appt scheduled for tomorrow with a provider in the office. Asking if she should keep that appt or if there is something that could be called in for her to take additional if needed. Please contact patient back with an update, thank you.

## 2024-12-15 LAB
25(OH)D3+25(OH)D2 SERPL-MCNC: 57.5 NG/ML (ref 30–100)
ALBUMIN SERPL-MCNC: 4.2 G/DL (ref 3.9–4.9)
ALP SERPL-CCNC: 54 IU/L (ref 44–121)
ALT SERPL-CCNC: 25 IU/L (ref 0–32)
AST SERPL-CCNC: 23 IU/L (ref 0–40)
BILIRUB SERPL-MCNC: 0.5 MG/DL (ref 0–1.2)
BUN SERPL-MCNC: 14 MG/DL (ref 6–24)
BUN/CREAT SERPL: 13 (ref 9–23)
CALCIUM SERPL-MCNC: 9.2 MG/DL (ref 8.7–10.2)
CHLORIDE SERPL-SCNC: 103 MMOL/L (ref 96–106)
CHOLEST SERPL-MCNC: 169 MG/DL (ref 100–199)
CO2 SERPL-SCNC: 26 MMOL/L (ref 20–29)
CREAT SERPL-MCNC: 1.06 MG/DL (ref 0.57–1)
EGFR: 64 ML/MIN/1.73
EST. AVERAGE GLUCOSE BLD GHB EST-MCNC: 108 MG/DL
FERRITIN SERPL-MCNC: 27 NG/ML (ref 15–150)
GLOBULIN SER-MCNC: 2.2 G/DL (ref 1.5–4.5)
GLUCOSE SERPL-MCNC: 102 MG/DL (ref 70–99)
HBA1C MFR BLD: 5.4 % (ref 4.8–5.6)
HDLC SERPL-MCNC: 44 MG/DL
IRON SATN MFR SERPL: 25 % (ref 15–55)
IRON SERPL-MCNC: 76 UG/DL (ref 27–159)
LDLC SERPL CALC-MCNC: 96 MG/DL (ref 0–99)
LDLC/HDLC SERPL: 2.2 RATIO (ref 0–3.2)
POTASSIUM SERPL-SCNC: 4.5 MMOL/L (ref 3.5–5.2)
PROT SERPL-MCNC: 6.4 G/DL (ref 6–8.5)
SL AMB VLDL CHOLESTEROL CALC: 29 MG/DL (ref 5–40)
SODIUM SERPL-SCNC: 139 MMOL/L (ref 134–144)
T3FREE SERPL-MCNC: 3.4 PG/ML (ref 2–4.4)
T3REVERSE SERPL-MCNC: 18.1 NG/DL (ref 9.2–24.1)
T4 FREE SERPL-MCNC: 1.07 NG/DL (ref 0.82–1.77)
TIBC SERPL-MCNC: 307 UG/DL (ref 250–450)
TRIGL SERPL-MCNC: 164 MG/DL (ref 0–149)
TSH SERPL DL<=0.005 MIU/L-ACNC: 1.94 UIU/ML (ref 0.45–4.5)
UIBC SERPL-MCNC: 231 UG/DL (ref 131–425)
VIT B12 SERPL-MCNC: 665 PG/ML (ref 232–1245)

## 2024-12-20 ENCOUNTER — RESULTS FOLLOW-UP (OUTPATIENT)
Dept: FAMILY MEDICINE CLINIC | Facility: CLINIC | Age: 49
End: 2024-12-20

## 2024-12-20 DIAGNOSIS — R94.4 DECREASED CALCULATED GFR: Primary | ICD-10-CM

## 2024-12-30 ENCOUNTER — TRANSCRIBE ORDERS (OUTPATIENT)
Dept: SCHEDULING | Age: 49
End: 2024-12-30

## 2024-12-30 DIAGNOSIS — T83.39XA OTHER MECHANICAL COMPLICATION OF INTRAUTERINE CONTRACEPTIVE DEVICE, INITIAL ENCOUNTER: Primary | ICD-10-CM

## 2025-01-08 ENCOUNTER — HOSPITAL ENCOUNTER (OUTPATIENT)
Dept: RADIOLOGY | Age: 50
Discharge: HOME | End: 2025-01-08
Attending: NURSE PRACTITIONER
Payer: COMMERCIAL

## 2025-01-08 DIAGNOSIS — T83.39XA OTHER MECHANICAL COMPLICATION OF INTRAUTERINE CONTRACEPTIVE DEVICE, INITIAL ENCOUNTER: ICD-10-CM

## 2025-01-08 PROCEDURE — 76830 TRANSVAGINAL US NON-OB: CPT

## 2025-01-11 LAB
BUN SERPL-MCNC: 17 MG/DL (ref 6–24)
BUN/CREAT SERPL: 19 (ref 9–23)
CALCIUM SERPL-MCNC: 10.2 MG/DL (ref 8.7–10.2)
CHLORIDE SERPL-SCNC: 102 MMOL/L (ref 96–106)
CO2 SERPL-SCNC: 22 MMOL/L (ref 20–29)
CREAT SERPL-MCNC: 0.9 MG/DL (ref 0.57–1)
EGFR: 78 ML/MIN/1.73
GLUCOSE SERPL-MCNC: 105 MG/DL (ref 70–99)
POTASSIUM SERPL-SCNC: 4.8 MMOL/L (ref 3.5–5.2)
SODIUM SERPL-SCNC: 140 MMOL/L (ref 134–144)

## 2025-01-20 ENCOUNTER — HOSPITAL ENCOUNTER (OUTPATIENT)
Dept: CT IMAGING | Facility: HOSPITAL | Age: 50
Discharge: HOME | End: 2025-01-20
Payer: COMMERCIAL

## 2025-01-20 ENCOUNTER — HOSPITAL ENCOUNTER (OUTPATIENT)
Dept: NON INVASIVE DIAGNOSTICS | Facility: HOSPITAL | Age: 50
Discharge: HOME | End: 2025-01-20
Payer: COMMERCIAL

## 2025-01-20 DIAGNOSIS — Z13.6 ENCOUNTER FOR SCREENING FOR VASCULAR DISEASE: ICD-10-CM

## 2025-01-20 DIAGNOSIS — Z13.6 SCREENING FOR HEART DISEASE: ICD-10-CM

## 2025-01-20 PROCEDURE — 93922 UPR/L XTREMITY ART 2 LEVELS: CPT

## 2025-01-20 PROCEDURE — 93978 VASCULAR STUDY: CPT | Performed by: SURGERY

## 2025-01-20 PROCEDURE — 93922 UPR/L XTREMITY ART 2 LEVELS: CPT | Performed by: SURGERY

## 2025-01-20 PROCEDURE — 93880 EXTRACRANIAL BILAT STUDY: CPT | Performed by: SURGERY

## 2025-01-20 PROCEDURE — 75571 CT HRT W/O DYE W/CA TEST: CPT

## 2025-01-21 ENCOUNTER — TELEPHONE (OUTPATIENT)
Dept: FAMILY MEDICINE CLINIC | Facility: CLINIC | Age: 50
End: 2025-01-21

## 2025-01-21 DIAGNOSIS — N63.20 MASS OF LEFT BREAST, UNSPECIFIED QUADRANT: Primary | ICD-10-CM

## 2025-01-21 NOTE — TELEPHONE ENCOUNTER
Coronary calcium score was 0 which is great.     Incidental finding of 10 mm nodular density in the inferomedial left breast. Suggest follow-up mammographic evaluation.     Diagnostic mammogram order placed.       Ami

## 2025-01-21 NOTE — TELEPHONE ENCOUNTER
Radiology reading room calling with significant finding on the CT Coronary Calcium screening test.

## 2025-01-23 NOTE — TELEPHONE ENCOUNTER
Roberta from [facility] called to request referral for diagnostic mammo as well as CT report from 1/20 to be faxed to [phone]. Pt has appt on 2/17.

## 2025-01-27 ENCOUNTER — TELEPHONE (OUTPATIENT)
Age: 50
End: 2025-01-27

## 2025-01-27 NOTE — TELEPHONE ENCOUNTER
Patient called in returning Donita's call - advised her per notes in other encounter -      I reviewed billing from my end. The physician portion was all covered as you mentioned. It is the hospital portion that has not been fully processed I believe. I would encourage contacting billing to discuss at [phone] or [facility] at [phone] as they handle their own billing and would be able to provide the best information for you.

## (undated) DEVICE — SUT VICRYL 2-0 SH 27 IN UNDYED J417H

## (undated) DEVICE — SMOKE EVACUATION TUBING WITH 8 IN INTEGRAL WAND AND SPONGE GUARD: Brand: BUFFALO FILTER

## (undated) DEVICE — GLOVE PI ULTRA TOUCH SZ.8.0

## (undated) DEVICE — UNDER BUTTOCKS DRAPE W/FLUID CONTROL POUCH: Brand: CONVERTORS

## (undated) DEVICE — 2000CC GUARDIAN II: Brand: GUARDIAN

## (undated) DEVICE — EXIDINE 4 PCT

## (undated) DEVICE — SCD SEQUENTIAL COMPRESSION COMFORT SLEEVE MEDIUM KNEE LENGTH: Brand: KENDALL SCD

## (undated) DEVICE — PREMIUM DRY TRAY LF: Brand: MEDLINE INDUSTRIES, INC.

## (undated) DEVICE — VIAL DECANTER

## (undated) DEVICE — NEEDLE HYPO 22G X 1-1/2 IN

## (undated) DEVICE — CATH FOLEY 18FR 5ML 2 WAY UNCOATED SILICONE

## (undated) DEVICE — IV FLUSH NSS 10ML POSIFLUSH

## (undated) DEVICE — CAUTERY TIP POLISHER: Brand: DEVON

## (undated) DEVICE — ELECTROSURGICAL DEVICE HOLSTER;FOR USE WITH MAXIMUM PEAK VOLTAGE OF 4000 V: Brand: FORCE TRIVERSE

## (undated) DEVICE — BULB SYRINGE,IRRIGATION WITH PROTECTIVE CAP: Brand: DOVER

## (undated) DEVICE — MEDI-VAC YANKAUER SUCTION HANDLE W/BULBOUS AND CONTROL VENT: Brand: CARDINAL HEALTH

## (undated) DEVICE — ALLENTOWN DR  LUCENTE S LAP PK: Brand: CARDINAL HEALTH

## (undated) DEVICE — TUBING SUCTION 5MM X 12 FT

## (undated) DEVICE — INTENDED FOR TISSUE SEPARATION, AND OTHER PROCEDURES THAT REQUIRE A SHARP SURGICAL BLADE TO PUNCTURE OR CUT.: Brand: BARD-PARKER SAFETY BLADES SIZE 11, STERILE